# Patient Record
Sex: MALE | Race: BLACK OR AFRICAN AMERICAN | NOT HISPANIC OR LATINO | Employment: UNEMPLOYED | URBAN - METROPOLITAN AREA
[De-identification: names, ages, dates, MRNs, and addresses within clinical notes are randomized per-mention and may not be internally consistent; named-entity substitution may affect disease eponyms.]

---

## 2017-10-05 ENCOUNTER — OFFICE VISIT (OUTPATIENT)
Dept: RETAIL CLINIC | Facility: CLINIC | Age: 52
End: 2017-10-05

## 2017-10-05 VITALS
DIASTOLIC BLOOD PRESSURE: 76 MMHG | SYSTOLIC BLOOD PRESSURE: 108 MMHG | RESPIRATION RATE: 22 BRPM | HEART RATE: 76 BPM | TEMPERATURE: 97.9 F | BODY MASS INDEX: 25.41 KG/M2 | OXYGEN SATURATION: 98 % | HEIGHT: 72 IN | WEIGHT: 187.6 LBS

## 2017-10-05 DIAGNOSIS — J06.9 UPPER RESPIRATORY TRACT INFECTION, UNSPECIFIED TYPE: Primary | ICD-10-CM

## 2017-10-05 DIAGNOSIS — J40 BRONCHITIS: ICD-10-CM

## 2017-10-05 PROCEDURE — 99212 OFFICE O/P EST SF 10 MIN: CPT | Performed by: NURSE PRACTITIONER

## 2017-10-05 RX ORDER — ALBUTEROL SULFATE 90 UG/1
2 AEROSOL, METERED RESPIRATORY (INHALATION) EVERY 4 HOURS PRN
Qty: 6.7 G | Refills: 0 | Status: SHIPPED | OUTPATIENT
Start: 2017-10-05 | End: 2017-12-15

## 2017-10-05 RX ORDER — GUAIFENESIN 600 MG/1
1200 TABLET, EXTENDED RELEASE ORAL 2 TIMES DAILY
Qty: 40 TABLET | Refills: 0 | Status: SHIPPED | OUTPATIENT
Start: 2017-10-05 | End: 2017-10-15

## 2017-10-05 RX ORDER — DEXTROMETHORPHAN HYDROBROMIDE AND PROMETHAZINE HYDROCHLORIDE 15; 6.25 MG/5ML; MG/5ML
2.5 SYRUP ORAL 4 TIMES DAILY PRN
Qty: 118 ML | Refills: 0 | Status: SHIPPED | OUTPATIENT
Start: 2017-10-05 | End: 2018-01-22

## 2017-10-05 RX ORDER — BENZONATATE 200 MG/1
200 CAPSULE ORAL 3 TIMES DAILY PRN
Qty: 9 CAPSULE | Refills: 0 | Status: SHIPPED | OUTPATIENT
Start: 2017-10-05 | End: 2017-10-08

## 2017-10-05 RX ORDER — AZITHROMYCIN 250 MG/1
TABLET, FILM COATED ORAL
Qty: 6 TABLET | Refills: 0 | Status: SHIPPED | OUTPATIENT
Start: 2017-10-05 | End: 2018-01-22

## 2017-10-05 NOTE — PROGRESS NOTES
Subjective   Eduin Gaxiola is a 52 y.o. male, states he missed work yesterday and today due to not feeling well.  He has nasal congestion, fever (max 102.4), and has been coughing so hard it causes nausea/vomiting.    CHIEF COMPLAINT  URI      URI    This is a new problem. The current episode started yesterday. The problem has been gradually worsening. The maximum temperature recorded prior to his arrival was 102 - 102.9 F. The fever has been present for less than 1 day. Associated symptoms include congestion, coughing, diarrhea, ear pain, headaches, nausea, a plugged ear sensation, rhinorrhea, sinus pain and vomiting. Pertinent negatives include no abdominal pain, chest pain, dysuria, neck pain, rash, sneezing, sore throat, swollen glands or wheezing. He has tried acetaminophen for the symptoms. The treatment provided mild relief.       The following portions of the patient's history were reviewed and updated as appropriate: allergies, current mediations, past family history, past medical history, past social history, past surgical history, and problem list.    Review of Systems   Constitutional: Positive for appetite change, chills, fatigue and fever.   HENT: Positive for congestion, ear pain, rhinorrhea, sinus pain and sinus pressure. Negative for sneezing, sore throat, tinnitus, trouble swallowing and voice change.    Eyes: Negative for pain and itching.   Respiratory: Positive for cough. Negative for shortness of breath and wheezing.    Cardiovascular: Negative for chest pain.   Gastrointestinal: Positive for diarrhea, nausea and vomiting. Negative for abdominal pain.   Genitourinary: Negative for dysuria.   Musculoskeletal: Negative for neck pain.   Skin: Negative for rash.   Allergic/Immunologic: Negative for environmental allergies.   Neurological: Positive for dizziness and headaches.         Objective   Allergies   Allergen Reactions   • Penicillins Anaphylaxis         /76  Pulse 76  Temp 97.9 °F  "(36.6 °C)  Resp 22  Ht 72\" (182.9 cm)  Wt 187 lb 9.6 oz (85.1 kg)  SpO2 98%  BMI 25.44 kg/m2    Physical Exam   Constitutional: He is oriented to person, place, and time. He appears well-developed and well-nourished. He does not have a sickly appearance.   HENT:   Head: Normocephalic.   Right Ear: Tympanic membrane and ear canal normal.   Left Ear: Ear canal normal. A middle ear effusion is present.   Nose: Right sinus exhibits maxillary sinus tenderness. Right sinus exhibits no frontal sinus tenderness. Left sinus exhibits maxillary sinus tenderness. Left sinus exhibits no frontal sinus tenderness.   Mouth/Throat: Uvula is midline and mucous membranes are normal. Posterior oropharyngeal erythema present. No oropharyngeal exudate. No tonsillar exudate.   Eyes: Lids are normal. Pupils are equal, round, and reactive to light. Right conjunctiva is not injected. Left conjunctiva is not injected.   Cardiovascular: Normal rate and regular rhythm.    Pulmonary/Chest: Effort normal. He has wheezes.   Abdominal: Soft. Bowel sounds are normal. He exhibits no distension. There is no tenderness.   Lymphadenopathy:     He has no cervical adenopathy.   Neurological: He is alert and oriented to person, place, and time.   Skin: Skin is warm and dry.       Assessment/Plan   There are no diagnoses linked to this encounter.         An After Visit Summary was printed, reviewed, and given to the patient. Understanding verbalized and agrees with treatment plan.  If no improvement or becomes worse, follow up with primary or go to [facility]/ER.        October 5, 2017  11:18 AM  "

## 2017-10-05 NOTE — PATIENT INSTRUCTIONS
"Upper Respiratory Infection, Adult  Most upper respiratory infections (URIs) are caused by a virus. A URI affects the nose, throat, and upper air passages. The most common type of URI is often called \"the common cold.\"  HOME CARE   · Take medicines only as told by your doctor.  · Gargle warm saltwater or take cough drops to comfort your throat as told by your doctor.  · Use a warm mist humidifier or inhale steam from a shower to increase air moisture. This may make it easier to breathe.  · Drink enough fluid to keep your pee (urine) clear or pale yellow.  · Eat soups and other clear broths.  · Have a healthy diet.  · Rest as needed.  · Go back to work when your fever is gone or your doctor says it is okay.  ¨ You may need to stay home longer to avoid giving your URI to others.  ¨ You can also wear a face mask and wash your hands often to prevent spread of the virus.  · Use your inhaler more if you have asthma.  · Do not use any tobacco products, including cigarettes, chewing tobacco, or electronic cigarettes. If you need help quitting, ask your doctor.  GET HELP IF:  · You are getting worse, not better.  · Your symptoms are not helped by medicine.  · You have chills.  · You are getting more short of breath.  · You have brown or red mucus.  · You have yellow or brown discharge from your nose.  · You have pain in your face, especially when you bend forward.  · You have a fever.  · You have puffy (swollen) neck glands.  · You have pain while swallowing.  · You have white areas in the back of your throat.  GET HELP RIGHT AWAY IF:   · You have very bad or constant:    Headache.    Ear pain.    Pain in your forehead, behind your eyes, and over your cheekbones (sinus pain).    Chest pain.  · You have long-lasting (chronic) lung disease and any of the following:    Wheezing.    Long-lasting cough.    Coughing up blood.    A change in your usual mucus.  · You have a stiff neck.  · You have changes in your:    Vision.   "  Hearing.    Thinking.    Mood.  MAKE SURE YOU:   · Understand these instructions.  · Will watch your condition.  · Will get help right away if you are not doing well or get worse.     This information is not intended to replace advice given to you by your health care provider. Make sure you discuss any questions you have with your health care provider.     Document Released: 06/05/2009 Document Revised: 05/03/2016 Document Reviewed: 03/25/2015  [employer] Interactive Patient Education ©2017 [employer]

## 2017-12-15 ENCOUNTER — HOSPITAL ENCOUNTER (EMERGENCY)
Facility: HOSPITAL | Age: 52
Discharge: HOME | End: 2017-12-15
Attending: EMERGENCY MEDICINE | Admitting: EMERGENCY MEDICINE

## 2017-12-15 ENCOUNTER — APPOINTMENT (OUTPATIENT)
Dept: CT IMAGING | Facility: HOSPITAL | Age: 52
End: 2017-12-15

## 2017-12-15 VITALS
RESPIRATION RATE: 16 BRPM | BODY MASS INDEX: 23.86 KG/M2 | HEIGHT: 73 IN | DIASTOLIC BLOOD PRESSURE: 86 MMHG | TEMPERATURE: 97.7 F | HEART RATE: 66 BPM | OXYGEN SATURATION: 100 % | SYSTOLIC BLOOD PRESSURE: 119 MMHG | WEIGHT: 180 LBS

## 2017-12-15 DIAGNOSIS — J40 BRONCHITIS: ICD-10-CM

## 2017-12-15 DIAGNOSIS — S30.0XXA LUMBAR CONTUSION, INITIAL ENCOUNTER: ICD-10-CM

## 2017-12-15 DIAGNOSIS — S29.8XXA BLUNT CHEST TRAUMA, INITIAL ENCOUNTER: Primary | ICD-10-CM

## 2017-12-15 LAB
ALBUMIN SERPL-MCNC: 4.4 G/DL (ref 3.2–4.8)
ALBUMIN/GLOB SERPL: 1.8 G/DL (ref 1.5–2.5)
ALP SERPL-CCNC: 60 U/L (ref 25–100)
ALT SERPL W P-5'-P-CCNC: 27 U/L (ref 7–40)
ANION GAP SERPL CALCULATED.3IONS-SCNC: 9 MMOL/L (ref 3–11)
AST SERPL-CCNC: 20 U/L (ref 0–33)
BASOPHILS # BLD AUTO: 0.05 10*3/MM3 (ref 0–0.2)
BASOPHILS NFR BLD AUTO: 0.4 % (ref 0–1)
BILIRUB SERPL-MCNC: 0.5 MG/DL (ref 0.3–1.2)
BUN BLD-MCNC: 9 MG/DL (ref 9–23)
BUN/CREAT SERPL: 11.3 (ref 7–25)
CALCIUM SPEC-SCNC: 9.5 MG/DL (ref 8.7–10.4)
CHLORIDE SERPL-SCNC: 105 MMOL/L (ref 99–109)
CO2 SERPL-SCNC: 27 MMOL/L (ref 20–31)
CREAT BLD-MCNC: 0.8 MG/DL (ref 0.6–1.3)
DEPRECATED RDW RBC AUTO: 47.9 FL (ref 37–54)
EOSINOPHIL # BLD AUTO: 0.11 10*3/MM3 (ref 0–0.3)
EOSINOPHIL NFR BLD AUTO: 0.9 % (ref 0–3)
ERYTHROCYTE [DISTWIDTH] IN BLOOD BY AUTOMATED COUNT: 14 % (ref 11.3–14.5)
GFR SERPL CREATININE-BSD FRML MDRD: 124 ML/MIN/1.73
GLOBULIN UR ELPH-MCNC: 2.5 GM/DL
GLUCOSE BLD-MCNC: 94 MG/DL (ref 70–100)
HCT VFR BLD AUTO: 43.7 % (ref 38.9–50.9)
HGB BLD-MCNC: 14.6 G/DL (ref 13.1–17.5)
IMM GRANULOCYTES # BLD: 0.02 10*3/MM3 (ref 0–0.03)
IMM GRANULOCYTES NFR BLD: 0.2 % (ref 0–0.6)
LYMPHOCYTES # BLD AUTO: 2.49 10*3/MM3 (ref 0.6–4.8)
LYMPHOCYTES NFR BLD AUTO: 20.4 % (ref 24–44)
MCH RBC QN AUTO: 31.3 PG (ref 27–31)
MCHC RBC AUTO-ENTMCNC: 33.4 G/DL (ref 32–36)
MCV RBC AUTO: 93.6 FL (ref 80–99)
MONOCYTES # BLD AUTO: 0.87 10*3/MM3 (ref 0–1)
MONOCYTES NFR BLD AUTO: 7.1 % (ref 0–12)
NEUTROPHILS # BLD AUTO: 8.66 10*3/MM3 (ref 1.5–8.3)
NEUTROPHILS NFR BLD AUTO: 71 % (ref 41–71)
PLATELET # BLD AUTO: 260 10*3/MM3 (ref 150–450)
PMV BLD AUTO: 10.4 FL (ref 6–12)
POTASSIUM BLD-SCNC: 3.9 MMOL/L (ref 3.5–5.5)
PROT SERPL-MCNC: 6.9 G/DL (ref 5.7–8.2)
RBC # BLD AUTO: 4.67 10*6/MM3 (ref 4.2–5.76)
SODIUM BLD-SCNC: 141 MMOL/L (ref 132–146)
WBC NRBC COR # BLD: 12.2 10*3/MM3 (ref 3.5–10.8)

## 2017-12-15 PROCEDURE — 71250 CT THORAX DX C-: CPT

## 2017-12-15 PROCEDURE — 80053 COMPREHEN METABOLIC PANEL: CPT | Performed by: EMERGENCY MEDICINE

## 2017-12-15 PROCEDURE — 72131 CT LUMBAR SPINE W/O DYE: CPT

## 2017-12-15 PROCEDURE — 99284 EMERGENCY DEPT VISIT MOD MDM: CPT

## 2017-12-15 PROCEDURE — 85025 COMPLETE CBC W/AUTO DIFF WBC: CPT | Performed by: EMERGENCY MEDICINE

## 2017-12-15 PROCEDURE — 93005 ELECTROCARDIOGRAM TRACING: CPT

## 2017-12-15 RX ORDER — HYDROCODONE BITARTRATE AND ACETAMINOPHEN 5; 325 MG/1; MG/1
1 TABLET ORAL EVERY 6 HOURS PRN
Qty: 12 TABLET | Refills: 0 | Status: SHIPPED | OUTPATIENT
Start: 2017-12-15 | End: 2018-01-22

## 2017-12-15 RX ORDER — HYDROCODONE BITARTRATE AND ACETAMINOPHEN 5; 325 MG/1; MG/1
1 TABLET ORAL ONCE
Status: COMPLETED | OUTPATIENT
Start: 2017-12-15 | End: 2017-12-15

## 2017-12-15 RX ORDER — ALBUTEROL SULFATE 90 UG/1
2 AEROSOL, METERED RESPIRATORY (INHALATION) EVERY 4 HOURS PRN
Qty: 6.7 G | Refills: 0 | Status: SHIPPED | OUTPATIENT
Start: 2017-12-15 | End: 2018-02-12

## 2017-12-15 RX ADMIN — HYDROCODONE BITARTRATE AND ACETAMINOPHEN 1 TABLET: 5; 325 TABLET ORAL at 11:47

## 2017-12-15 NOTE — ED PROVIDER NOTES
Subjective   Patient is a 52 y.o. male presenting with chest pain.   History provided by:  Patient   used: No    Chest Pain   Pain location:  Substernal area  Pain quality: sharp and stabbing    Pain radiates to:  Does not radiate  Pain severity:  Severe  Onset quality:  Sudden  Timing:  Intermittent  Progression:  Waxing and waning  Chronicity:  New  Context comment:  Pt was using a log splitter at job.  Log kicked out  hit him in the chest. Pt fell back and hurt backat same time.   Relieved by:  Rest  Worsened by:  Movement, coughing and deep breathing  Ineffective treatments:  None tried  Associated symptoms: no abdominal pain, no back pain, no diaphoresis, no fatigue, no headache, no heartburn, no nausea, no orthopnea, no palpitations, no shortness of breath, no vomiting and no weakness    Risk factors: male sex    Risk factors: no coronary artery disease, no diabetes mellitus, no Mckinley-Danlos syndrome, no hypertension, no immobilization, no prior DVT/PE and no surgery        Review of Systems   Constitutional: Negative for diaphoresis and fatigue.   Respiratory: Negative for chest tightness, shortness of breath and wheezing.    Cardiovascular: Positive for chest pain. Negative for palpitations and orthopnea.   Gastrointestinal: Negative for abdominal pain, constipation, heartburn, nausea and vomiting.   Genitourinary: Negative for dysuria, frequency, hematuria and urgency.   Musculoskeletal: Negative for back pain and neck pain.   Skin: Negative for pallor and rash.   Neurological: Negative for weakness and headaches.   Psychiatric/Behavioral: Negative.    All other systems reviewed and are negative.      History reviewed. No pertinent past medical history.    Allergies   Allergen Reactions   • Penicillins Anaphylaxis       Past Surgical History:   Procedure Laterality Date   • ARM REPLANTATION Right    • HERNIA REPAIR         Family History   Problem Relation Age of Onset   • Heart disease  Mother    • Cancer Father        Social History     Social History   • Marital status:      Spouse name: N/A   • Number of children: N/A   • Years of education: N/A     Social History Main Topics   • Smoking status: Current Every Day Smoker     Packs/day: 0.50     Years: 40.00     Types: Cigarettes   • Smokeless tobacco: Never Used   • Alcohol use No   • Drug use: No   • Sexual activity: Defer     Other Topics Concern   • None     Social History Narrative   • None           Objective   Physical Exam   Constitutional: He is oriented to person, place, and time. He appears well-developed and well-nourished.   HENT:   Head: Normocephalic and atraumatic.   Right Ear: External ear normal.   Left Ear: External ear normal.   Nose: Nose normal.   Mouth/Throat: Oropharynx is clear and moist.   Eyes: Conjunctivae and EOM are normal. Pupils are equal, round, and reactive to light. No scleral icterus.   Neck: Normal range of motion. No thyromegaly present.   Cardiovascular: Normal rate, regular rhythm and normal heart sounds.    Pulmonary/Chest: Effort normal and breath sounds normal. No respiratory distress. He has no wheezes. He has no rales. He exhibits tenderness ( tender over the sternum.  There is no ecchymosis abrasions or crepitus.   ribs are also diffusely tender on either side of the sternum.  No flail segment).   Abdominal: Soft. Bowel sounds are normal. He exhibits no distension. There is no tenderness.   Musculoskeletal: Normal range of motion.   Lymphadenopathy:     He has no cervical adenopathy.   Neurological: He is alert and oriented to person, place, and time. He has normal reflexes. He displays normal reflexes. No cranial nerve deficit. Coordination normal.   Skin: Skin is warm and dry.   Psychiatric: He has a normal mood and affect. His behavior is normal. Judgment and thought content normal.   Nursing note and vitals reviewed.      Procedures         ED Course  ED Course                  MDM  Number  of Diagnoses or Management Options  new and requires workup  new and requires workup     Amount and/or Complexity of Data Reviewed  Clinical lab tests: reviewed and ordered  Tests in the radiology section of CPT®: reviewed and ordered  Discuss the patient with other providers: yes    Patient Progress  Patient progress: stable      Final diagnoses:   Blunt chest trauma, initial encounter   Lumbar contusion, initial encounter            RAVEN Anne  12/18/17 0737

## 2017-12-17 ENCOUNTER — APPOINTMENT (OUTPATIENT)
Dept: CT IMAGING | Facility: HOSPITAL | Age: 52
End: 2017-12-17

## 2017-12-17 ENCOUNTER — HOSPITAL ENCOUNTER (EMERGENCY)
Facility: HOSPITAL | Age: 52
Discharge: HOME | End: 2017-12-17
Attending: EMERGENCY MEDICINE | Admitting: EMERGENCY MEDICINE

## 2017-12-17 VITALS
HEIGHT: 73 IN | OXYGEN SATURATION: 98 % | WEIGHT: 180 LBS | HEART RATE: 85 BPM | DIASTOLIC BLOOD PRESSURE: 72 MMHG | RESPIRATION RATE: 20 BRPM | TEMPERATURE: 100.2 F | BODY MASS INDEX: 23.86 KG/M2 | SYSTOLIC BLOOD PRESSURE: 105 MMHG

## 2017-12-17 DIAGNOSIS — A08.4 VIRAL GASTROENTERITIS: ICD-10-CM

## 2017-12-17 DIAGNOSIS — S29.8XXA BLUNT TRAUMA TO CHEST, INITIAL ENCOUNTER: Primary | ICD-10-CM

## 2017-12-17 LAB
ALBUMIN SERPL-MCNC: 4.7 G/DL (ref 3.2–4.8)
ALBUMIN/GLOB SERPL: 1.6 G/DL (ref 1.5–2.5)
ALP SERPL-CCNC: 64 U/L (ref 25–100)
ALT SERPL W P-5'-P-CCNC: 28 U/L (ref 7–40)
ANION GAP SERPL CALCULATED.3IONS-SCNC: 7 MMOL/L (ref 3–11)
AST SERPL-CCNC: 25 U/L (ref 0–33)
BACTERIA UR QL AUTO: ABNORMAL /HPF
BASOPHILS # BLD AUTO: 0.01 10*3/MM3 (ref 0–0.2)
BASOPHILS NFR BLD AUTO: 0.1 % (ref 0–1)
BILIRUB SERPL-MCNC: 1 MG/DL (ref 0.3–1.2)
BILIRUB UR QL STRIP: NEGATIVE
BUN BLD-MCNC: 12 MG/DL (ref 9–23)
BUN/CREAT SERPL: 12 (ref 7–25)
CALCIUM SPEC-SCNC: 9.4 MG/DL (ref 8.7–10.4)
CHLORIDE SERPL-SCNC: 103 MMOL/L (ref 99–109)
CLARITY UR: ABNORMAL
CO2 SERPL-SCNC: 28 MMOL/L (ref 20–31)
COLOR UR: YELLOW
CREAT BLD-MCNC: 1 MG/DL (ref 0.6–1.3)
D-LACTATE SERPL-SCNC: 0.7 MMOL/L (ref 0.5–2)
DEPRECATED RDW RBC AUTO: 47.1 FL (ref 37–54)
EOSINOPHIL # BLD AUTO: 0.06 10*3/MM3 (ref 0–0.3)
EOSINOPHIL NFR BLD AUTO: 0.6 % (ref 0–3)
ERYTHROCYTE [DISTWIDTH] IN BLOOD BY AUTOMATED COUNT: 13.8 % (ref 11.3–14.5)
GFR SERPL CREATININE-BSD FRML MDRD: 96 ML/MIN/1.73
GLOBULIN UR ELPH-MCNC: 2.9 GM/DL
GLUCOSE BLD-MCNC: 97 MG/DL (ref 70–100)
GLUCOSE UR STRIP-MCNC: NEGATIVE MG/DL
HCT VFR BLD AUTO: 46.4 % (ref 38.9–50.9)
HGB BLD-MCNC: 15.9 G/DL (ref 13.1–17.5)
HGB UR QL STRIP.AUTO: NEGATIVE
HYALINE CASTS UR QL AUTO: ABNORMAL /LPF
IMM GRANULOCYTES # BLD: 0.03 10*3/MM3 (ref 0–0.03)
IMM GRANULOCYTES NFR BLD: 0.3 % (ref 0–0.6)
KETONES UR QL STRIP: NEGATIVE
LEUKOCYTE ESTERASE UR QL STRIP.AUTO: NEGATIVE
LIPASE SERPL-CCNC: 32 U/L (ref 6–51)
LYMPHOCYTES # BLD AUTO: 1.34 10*3/MM3 (ref 0.6–4.8)
LYMPHOCYTES NFR BLD AUTO: 13.3 % (ref 24–44)
MCH RBC QN AUTO: 31.8 PG (ref 27–31)
MCHC RBC AUTO-ENTMCNC: 34.3 G/DL (ref 32–36)
MCV RBC AUTO: 92.8 FL (ref 80–99)
MONOCYTES # BLD AUTO: 0.49 10*3/MM3 (ref 0–1)
MONOCYTES NFR BLD AUTO: 4.9 % (ref 0–12)
NEUTROPHILS # BLD AUTO: 8.15 10*3/MM3 (ref 1.5–8.3)
NEUTROPHILS NFR BLD AUTO: 80.8 % (ref 41–71)
NITRITE UR QL STRIP: NEGATIVE
PH UR STRIP.AUTO: 8 [PH] (ref 5–8)
PLATELET # BLD AUTO: 253 10*3/MM3 (ref 150–450)
PMV BLD AUTO: 10.6 FL (ref 6–12)
POTASSIUM BLD-SCNC: 3.7 MMOL/L (ref 3.5–5.5)
PROT SERPL-MCNC: 7.6 G/DL (ref 5.7–8.2)
PROT UR QL STRIP: NEGATIVE
RBC # BLD AUTO: 5 10*6/MM3 (ref 4.2–5.76)
RBC # UR: ABNORMAL /HPF
REF LAB TEST METHOD: ABNORMAL
SODIUM BLD-SCNC: 138 MMOL/L (ref 132–146)
SP GR UR STRIP: 1.02 (ref 1–1.03)
SQUAMOUS #/AREA URNS HPF: ABNORMAL /HPF
TROPONIN I SERPL-MCNC: 0 NG/ML (ref 0–0.07)
TROPONIN I SERPL-MCNC: 0 NG/ML (ref 0–0.07)
UROBILINOGEN UR QL STRIP: ABNORMAL
WBC NRBC COR # BLD: 10.08 10*3/MM3 (ref 3.5–10.8)
WBC UR QL AUTO: ABNORMAL /HPF

## 2017-12-17 PROCEDURE — 96375 TX/PRO/DX INJ NEW DRUG ADDON: CPT

## 2017-12-17 PROCEDURE — 81001 URINALYSIS AUTO W/SCOPE: CPT | Performed by: EMERGENCY MEDICINE

## 2017-12-17 PROCEDURE — 96374 THER/PROPH/DIAG INJ IV PUSH: CPT

## 2017-12-17 PROCEDURE — 84484 ASSAY OF TROPONIN QUANT: CPT

## 2017-12-17 PROCEDURE — 25010000002 FENTANYL CITRATE (PF) 100 MCG/2ML SOLUTION: Performed by: EMERGENCY MEDICINE

## 2017-12-17 PROCEDURE — 99284 EMERGENCY DEPT VISIT MOD MDM: CPT

## 2017-12-17 PROCEDURE — 25010000002 ONDANSETRON PER 1 MG: Performed by: EMERGENCY MEDICINE

## 2017-12-17 PROCEDURE — 80053 COMPREHEN METABOLIC PANEL: CPT | Performed by: EMERGENCY MEDICINE

## 2017-12-17 PROCEDURE — 93005 ELECTROCARDIOGRAM TRACING: CPT

## 2017-12-17 PROCEDURE — 83690 ASSAY OF LIPASE: CPT | Performed by: EMERGENCY MEDICINE

## 2017-12-17 PROCEDURE — 83605 ASSAY OF LACTIC ACID: CPT | Performed by: NURSE PRACTITIONER

## 2017-12-17 PROCEDURE — 93005 ELECTROCARDIOGRAM TRACING: CPT | Performed by: NURSE PRACTITIONER

## 2017-12-17 PROCEDURE — 96376 TX/PRO/DX INJ SAME DRUG ADON: CPT

## 2017-12-17 PROCEDURE — 71275 CT ANGIOGRAPHY CHEST: CPT

## 2017-12-17 PROCEDURE — 96361 HYDRATE IV INFUSION ADD-ON: CPT

## 2017-12-17 PROCEDURE — 0 IOPAMIDOL PER 1 ML: Performed by: EMERGENCY MEDICINE

## 2017-12-17 PROCEDURE — 93005 ELECTROCARDIOGRAM TRACING: CPT | Performed by: EMERGENCY MEDICINE

## 2017-12-17 PROCEDURE — 74177 CT ABD & PELVIS W/CONTRAST: CPT

## 2017-12-17 PROCEDURE — 85025 COMPLETE CBC W/AUTO DIFF WBC: CPT | Performed by: EMERGENCY MEDICINE

## 2017-12-17 RX ORDER — FENTANYL CITRATE 50 UG/ML
25 INJECTION, SOLUTION INTRAMUSCULAR; INTRAVENOUS ONCE
Status: COMPLETED | OUTPATIENT
Start: 2017-12-17 | End: 2017-12-17

## 2017-12-17 RX ORDER — ONDANSETRON 2 MG/ML
4 INJECTION INTRAMUSCULAR; INTRAVENOUS ONCE
Status: COMPLETED | OUTPATIENT
Start: 2017-12-17 | End: 2017-12-17

## 2017-12-17 RX ORDER — HYDROCODONE BITARTRATE AND ACETAMINOPHEN 5; 325 MG/1; MG/1
1 TABLET ORAL EVERY 6 HOURS PRN
Qty: 6 TABLET | Refills: 0 | Status: SHIPPED | OUTPATIENT
Start: 2017-12-17 | End: 2018-01-22

## 2017-12-17 RX ORDER — PROMETHAZINE HYDROCHLORIDE 25 MG/1
25 TABLET ORAL EVERY 6 HOURS PRN
Qty: 12 TABLET | Refills: 0 | Status: SHIPPED | OUTPATIENT
Start: 2017-12-17

## 2017-12-17 RX ORDER — SODIUM CHLORIDE 0.9 % (FLUSH) 0.9 %
10 SYRINGE (ML) INJECTION AS NEEDED
Status: DISCONTINUED | OUTPATIENT
Start: 2017-12-17 | End: 2017-12-18 | Stop reason: HOSPADM

## 2017-12-17 RX ADMIN — SODIUM CHLORIDE 1000 ML: 9 INJECTION, SOLUTION INTRAVENOUS at 19:37

## 2017-12-17 RX ADMIN — FENTANYL CITRATE 25 MCG: 50 INJECTION INTRAMUSCULAR; INTRAVENOUS at 19:38

## 2017-12-17 RX ADMIN — IOPAMIDOL 85 ML: 755 INJECTION, SOLUTION INTRAVENOUS at 20:08

## 2017-12-17 RX ADMIN — FENTANYL CITRATE 25 MCG: 50 INJECTION INTRAMUSCULAR; INTRAVENOUS at 22:19

## 2017-12-17 RX ADMIN — ONDANSETRON 4 MG: 2 INJECTION INTRAMUSCULAR; INTRAVENOUS at 19:37

## 2017-12-18 NOTE — ED PROVIDER NOTES
Subjective   HPI Comments: Eduin Gaxiola is a 52 y.o.male who presents to the ED with c/o chest pain. He states he was running a log splitter 3 days ago when a log suddenly shot off the machine hitting him in the chest. He reports he was experiencing increasing chest pain prompting him to come into the ED 2 days ago for evaluation. He was seen by RAVEN Spence, at the time c/o substerbnal chest tenderness but denied abdominal pain, vomiting, nausea, shortness of breath, or back pain. Today he c/o heavy, substernal chest pain radiating to his right-sided chest and back worsening with deep breathing. He states he developed cramping abdominal pain onset at 1730. He also c/o shortness of breath, subjective fevers and 2 vomiting episodes with greenish-yellow stomach contents. He reports he took his prescribed Lortab at 1730 with no relief. He reports a family history of heart issues, personal history of hypertension, but denies a personal history of heart issues. He reports he smokes cigarettes and drinks coffee.          Patient is a 52 y.o. male presenting with chest pain.   History provided by:  Patient  Chest Pain   Pain location:  Substernal area  Pain quality: pressure    Pain quality comment:  Heaviness  Pain radiates to:  Mid back  Pain severity:  Moderate  Onset quality:  Gradual  Duration:  3 days  Timing:  Constant  Progression:  Worsening  Chronicity:  New  Context: breathing (deep)    Relieved by:  Nothing  Worsened by:  Deep breathing  Ineffective treatments:  Rest (Lortab)  Associated symptoms: abdominal pain, back pain, fever, nausea, shortness of breath and vomiting    Risk factors: hypertension, male sex and smoking        Review of Systems   Constitutional: Positive for fever.   Respiratory: Positive for shortness of breath.    Cardiovascular: Positive for chest pain.   Gastrointestinal: Positive for abdominal pain, nausea and vomiting.   Musculoskeletal: Positive for back pain.   All other  systems reviewed and are negative.      History reviewed. No pertinent past medical history.    Allergies   Allergen Reactions   • Penicillins Anaphylaxis       Past Surgical History:   Procedure Laterality Date   • ARM REPLANTATION Right    • HERNIA REPAIR         Family History   Problem Relation Age of Onset   • Heart disease Mother    • Cancer Father        Social History     Social History   • Marital status:      Spouse name: N/A   • Number of children: N/A   • Years of education: N/A     Social History Main Topics   • Smoking status: Current Every Day Smoker     Packs/day: 0.50     Years: 40.00     Types: Cigarettes   • Smokeless tobacco: Never Used   • Alcohol use No   • Drug use: No   • Sexual activity: Defer     Other Topics Concern   • None     Social History Narrative   • None         Objective   Physical Exam   Constitutional: He is oriented to person, place, and time. He appears well-developed and well-nourished.   Pt is uncomfortable, reports pain with position changes.  Pt is holding chest with movement.    HENT:   Head: Normocephalic and atraumatic.   Right Ear: External ear normal.   Left Ear: External ear normal.   Mouth/Throat: Oropharynx is clear and moist. No oropharyngeal exudate.   Eyes: EOM are normal. Pupils are equal, round, and reactive to light.   Neck: Normal range of motion. No spinous process tenderness and no muscular tenderness present. Normal range of motion present.   Cardiovascular: Normal heart sounds.  Tachycardia present.    Pulmonary/Chest: Effort normal and breath sounds normal. No respiratory distress. He exhibits tenderness (substernal). He exhibits no crepitus, no deformity and no swelling.   Abdominal: Soft. Bowel sounds are normal. He exhibits no distension. There is tenderness (diffuse).   Musculoskeletal:        Cervical back: He exhibits normal range of motion, no tenderness and no bony tenderness.        Thoracic back: He exhibits normal range of motion, no  tenderness and no bony tenderness.        Lumbar back: He exhibits normal range of motion, no tenderness and no bony tenderness.   Neurological: He is alert and oriented to person, place, and time. No cranial nerve deficit.   Skin: Skin is warm and dry.   Psychiatric: He has a normal mood and affect. His behavior is normal.   Nursing note and vitals reviewed.      Procedures         ED Course  ED Course   Comment By Time   Pt is advised of the results at this time.  Pt will be dc home, Pt to f/u with PCP, [facility] outpt clinic. Pt to take meds as ordered. Pt agrees and verb understanding. Cynthia Chapin, APRN 12/17 2244         Recent Results (from the past 24 hour(s))   Comprehensive Metabolic Panel    Collection Time: 12/17/17  7:32 PM   Result Value Ref Range    Glucose 97 70 - 100 mg/dL    BUN 12 9 - 23 mg/dL    Creatinine 1.00 0.60 - 1.30 mg/dL    Sodium 138 132 - 146 mmol/L    Potassium 3.7 3.5 - 5.5 mmol/L    Chloride 103 99 - 109 mmol/L    CO2 28.0 20.0 - 31.0 mmol/L    Calcium 9.4 8.7 - 10.4 mg/dL    Total Protein 7.6 5.7 - 8.2 g/dL    Albumin 4.70 3.20 - 4.80 g/dL    ALT (SGPT) 28 7 - 40 U/L    AST (SGOT) 25 0 - 33 U/L    Alkaline Phosphatase 64 25 - 100 U/L    Total Bilirubin 1.0 0.3 - 1.2 mg/dL    eGFR  African Amer 96 >60 mL/min/1.73    Globulin 2.9 gm/dL    A/G Ratio 1.6 1.5 - 2.5 g/dL    BUN/Creatinine Ratio 12.0 7.0 - 25.0    Anion Gap 7.0 3.0 - 11.0 mmol/L   Lipase    Collection Time: 12/17/17  7:32 PM   Result Value Ref Range    Lipase 32 6 - 51 U/L   CBC Auto Differential    Collection Time: 12/17/17  7:32 PM   Result Value Ref Range    WBC 10.08 3.50 - 10.80 10*3/mm3    RBC 5.00 4.20 - 5.76 10*6/mm3    Hemoglobin 15.9 13.1 - 17.5 g/dL    Hematocrit 46.4 38.9 - 50.9 %    MCV 92.8 80.0 - 99.0 fL    MCH 31.8 (H) 27.0 - 31.0 pg    MCHC 34.3 32.0 - 36.0 g/dL    RDW 13.8 11.3 - 14.5 %    RDW-SD 47.1 37.0 - 54.0 fl    MPV 10.6 6.0 - 12.0 fL    Platelets 253 150 - 450 10*3/mm3    Neutrophil %  80.8 (H) 41.0 - 71.0 %    Lymphocyte % 13.3 (L) 24.0 - 44.0 %    Monocyte % 4.9 0.0 - 12.0 %    Eosinophil % 0.6 0.0 - 3.0 %    Basophil % 0.1 0.0 - 1.0 %    Immature Grans % 0.3 0.0 - 0.6 %    Neutrophils, Absolute 8.15 1.50 - 8.30 10*3/mm3    Lymphocytes, Absolute 1.34 0.60 - 4.80 10*3/mm3    Monocytes, Absolute 0.49 0.00 - 1.00 10*3/mm3    Eosinophils, Absolute 0.06 0.00 - 0.30 10*3/mm3    Basophils, Absolute 0.01 0.00 - 0.20 10*3/mm3    Immature Grans, Absolute 0.03 0.00 - 0.03 10*3/mm3   POC Troponin, Rapid    Collection Time: 12/17/17  7:34 PM   Result Value Ref Range    Troponin I 0.00 0.00 - 0.07 ng/mL   Urinalysis With / Culture If Indicated - Urine, Clean Catch    Collection Time: 12/17/17  7:50 PM   Result Value Ref Range    Color, UA Yellow Yellow, Straw    Appearance, UA Cloudy (A) Clear    pH, UA 8.0 5.0 - 8.0    Specific Gravity, UA 1.017 1.001 - 1.030    Glucose, UA Negative Negative    Ketones, UA Negative Negative    Bilirubin, UA Negative Negative    Blood, UA Negative Negative    Protein, UA Negative Negative    Leuk Esterase, UA Negative Negative    Nitrite, UA Negative Negative    Urobilinogen, UA 2.0 E.U./dL (A) 0.2 - 1.0 E.U./dL   Urinalysis, Microscopic Only - Urine, Clean Catch    Collection Time: 12/17/17  7:50 PM   Result Value Ref Range    RBC, UA None Seen None Seen, 0-2 /HPF    WBC, UA 0-2 (A) None Seen /HPF    Bacteria, UA None Seen None Seen, Trace /HPF    Squamous Epithelial Cells, UA None Seen None Seen, 0-2 /HPF    Hyaline Casts, UA None Seen 0 - 6 /LPF    Methodology Automated Microscopy    POC Troponin, Rapid    Collection Time: 12/17/17  9:38 PM   Result Value Ref Range    Troponin I 0.00 0.00 - 0.07 ng/mL   Lactic Acid, Plasma    Collection Time: 12/17/17 10:15 PM   Result Value Ref Range    Lactate 0.7 0.5 - 2.0 mmol/L     Note: In addition to lab results from this visit, the labs listed above may include labs taken at another facility or during a different encounter within  the last 24 hours. Please correlate lab times with ED admission and discharge times for further clarification of the services performed during this visit.    CT Abdomen Pelvis With Contrast   Final Result     Findings suspicious for enterocolitis likely of infectious etiology.     Recommend followup as clinically indicated.         Other nonemergent findings as described.          THIS DOCUMENT HAS BEEN ELECTRONICALLY SIGNED BY CRIS MENDOZA MD      CT Angiogram Chest With Contrast   Final Result     No evidence of acute pulmonary thromboembolism.        Unremarkable lungs and mediastinum without consolidation or effusion.          THIS DOCUMENT HAS BEEN ELECTRONICALLY SIGNED BY CRIS MENDOZA MD        Vitals:    12/17/17 2200 12/17/17 2230 12/17/17 2230 12/17/17 2300   BP: 104/75 105/71  105/72   BP Location:       Patient Position:       Pulse:       Resp:       Temp:   100.2 °F (37.9 °C)    TempSrc:   Oral    SpO2: 96% 95%  98%   Weight:       Height:         Medications   sodium chloride 0.9 % bolus 1,000 mL (0 mL Intravenous Stopped 12/17/17 2040)   ondansetron (ZOFRAN) injection 4 mg (4 mg Intravenous Given 12/17/17 1937)   fentaNYL citrate (PF) (SUBLIMAZE) injection 25 mcg (25 mcg Intravenous Given 12/17/17 1938)   iopamidol (ISOVUE-370) 76 % injection 100 mL (85 mL Intravenous Given 12/17/17 2008)   fentaNYL citrate (PF) (SUBLIMAZE) injection 25 mcg (25 mcg Intravenous Given 12/17/17 2219)     ECG/EMG Results (last 24 hours)     Procedure Component Value Units Date/Time    ECG 12 Lead [[id number]] Collected:  12/17/17 1846     Updated:  12/17/17 1847    ECG 12 Lead [[id number]] Collected:  12/17/17 2133     Updated:  12/17/17 2135                    MDM    Final diagnoses:   Blunt trauma to chest, initial encounter   Viral gastroenteritis       Documentation assistance provided by [employer] [employer].  Information recorded by the abeba was done at my direction and has been verified and validated by me.      Dalton Edge  12/17/17 1949       Cynthia Chapin, APRN  12/18/17 0448

## 2017-12-18 NOTE — DISCHARGE INSTRUCTIONS
Follow up with one of the [facility] physician groups below to setup primary care. If you have trouble following up, please call Mireya Rudolph, our transitional care nurse, at [phone].    (Dr. Levi, Dr. Petty, Dr. Matias, and Dr. Dsouza.)  [facility], Primary Care, [phone], [address], [place] [address]    [facility], Primary Care, [phone], [address], [address] [place], [address] [facility]) [facility] Medical [facility], Primary Care, [phone], [address], [address] [place], [address] [facility], Primary Care, [phone], [address], [address] [place], [address]     [place] [address] [facility], Primary Care, [phone], [address], [address] [place], [address]    [place] [address] [facility], Primary Care, [phone], [address]    [place], [address] [facility], Primary Care, [phone], [address], [address] [place], [address] [facility], Primary Care, [phone], [address], [address] [place], [address] [facility]) [facility], Primary Care, [phone], [address], [address] [place], [address] [facility], Primary Care, [phone], [address], [address] [place], [address] [facility], Primary Care, [phone], [address], [place], [address]     [facility], Primary Care, [phone], [address] [id number]

## 2017-12-23 ENCOUNTER — HOSPITAL ENCOUNTER (EMERGENCY)
Facility: HOSPITAL | Age: 52
Discharge: HOME | End: 2017-12-23
Attending: EMERGENCY MEDICINE | Admitting: EMERGENCY MEDICINE

## 2017-12-23 VITALS
TEMPERATURE: 97.6 F | WEIGHT: 185 LBS | SYSTOLIC BLOOD PRESSURE: 106 MMHG | RESPIRATION RATE: 18 BRPM | HEART RATE: 72 BPM | OXYGEN SATURATION: 96 % | HEIGHT: 73 IN | BODY MASS INDEX: 24.52 KG/M2 | DIASTOLIC BLOOD PRESSURE: 75 MMHG
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
## 2017-12-23 DIAGNOSIS — M54.41 ACUTE RIGHT-SIDED LOW BACK PAIN WITH RIGHT-SIDED SCIATICA: Primary | ICD-10-CM

## 2017-12-23 PROCEDURE — 99283 EMERGENCY DEPT VISIT LOW MDM: CPT

## 2017-12-23 PROCEDURE — 96372 THER/PROPH/DIAG INJ SC/IM: CPT

## 2017-12-23 PROCEDURE — 63710000001 DEXAMETHASONE PER 0.25 MG: Performed by: EMERGENCY MEDICINE

## 2017-12-23 PROCEDURE — 25010000002 KETOROLAC TROMETHAMINE PER 15 MG: Performed by: EMERGENCY MEDICINE

## 2017-12-23 RX ORDER — LIDOCAINE 50 MG/G
2 PATCH TOPICAL
Status: DISCONTINUED | OUTPATIENT
Start: 2017-12-23 | End: 2017-12-23 | Stop reason: HOSPADM

## 2017-12-23 RX ORDER — PREDNISONE 10 MG/1
TABLET ORAL
Qty: 35 TABLET | Refills: 0 | Status: SHIPPED | OUTPATIENT
Start: 2017-12-23 | End: 2018-05-29 | Stop reason: RX

## 2017-12-23 RX ORDER — GABAPENTIN 300 MG/1
300 CAPSULE ORAL 3 TIMES DAILY
Qty: 21 CAPSULE | Refills: 0 | Status: SHIPPED | OUTPATIENT
Start: 2017-12-23 | End: 2018-02-12 | Stop reason: SDUPTHER

## 2017-12-23 RX ORDER — GABAPENTIN 300 MG/1
300 CAPSULE ORAL ONCE
Status: COMPLETED | OUTPATIENT
Start: 2017-12-23 | End: 2017-12-23

## 2017-12-23 RX ORDER — KETOROLAC TROMETHAMINE 30 MG/ML
30 INJECTION, SOLUTION INTRAMUSCULAR; INTRAVENOUS ONCE
Status: COMPLETED | OUTPATIENT
Start: 2017-12-23 | End: 2017-12-23

## 2017-12-23 RX ORDER — MELOXICAM 15 MG/1
15 TABLET ORAL DAILY
Qty: 10 TABLET | Refills: 0 | Status: SHIPPED | OUTPATIENT
Start: 2017-12-23 | End: 2018-01-22

## 2017-12-23 RX ORDER — ACETAMINOPHEN AND CODEINE PHOSPHATE 300; 30 MG/1; MG/1
2 TABLET ORAL EVERY 6 HOURS PRN
Qty: 24 TABLET | Refills: 0 | Status: SHIPPED | OUTPATIENT
Start: 2017-12-23 | End: 2018-01-22

## 2017-12-23 RX ADMIN — GABAPENTIN 300 MG: 300 CAPSULE ORAL at 11:16

## 2017-12-23 RX ADMIN — LIDOCAINE 2 PATCH: 50 PATCH CUTANEOUS at 11:15

## 2017-12-23 RX ADMIN — KETOROLAC TROMETHAMINE 30 MG: 30 INJECTION, SOLUTION INTRAMUSCULAR at 11:11

## 2017-12-23 RX ADMIN — DEXAMETHASONE 10 MG: 4 TABLET ORAL at 11:16

## 2017-12-27 ENCOUNTER — HOSPITAL ENCOUNTER (EMERGENCY)
Facility: HOSPITAL | Age: 52
Discharge: HOME | End: 2017-12-27
Attending: EMERGENCY MEDICINE | Admitting: EMERGENCY MEDICINE

## 2017-12-27 ENCOUNTER — APPOINTMENT (OUTPATIENT)
Dept: MRI IMAGING | Facility: HOSPITAL | Age: 52
End: 2017-12-27

## 2017-12-27 VITALS
SYSTOLIC BLOOD PRESSURE: 131 MMHG | HEIGHT: 73 IN | RESPIRATION RATE: 20 BRPM | OXYGEN SATURATION: 100 % | WEIGHT: 185 LBS | DIASTOLIC BLOOD PRESSURE: 78 MMHG | BODY MASS INDEX: 24.52 KG/M2 | HEART RATE: 64 BPM | TEMPERATURE: 97.4 F

## 2017-12-27 DIAGNOSIS — N52.9 ERECTILE DYSFUNCTION, UNSPECIFIED ERECTILE DYSFUNCTION TYPE: ICD-10-CM

## 2017-12-27 DIAGNOSIS — M54.41 ACUTE RIGHT-SIDED LOW BACK PAIN WITH RIGHT-SIDED SCIATICA: Primary | ICD-10-CM

## 2017-12-27 PROCEDURE — 72158 MRI LUMBAR SPINE W/O & W/DYE: CPT

## 2017-12-27 PROCEDURE — 96372 THER/PROPH/DIAG INJ SC/IM: CPT

## 2017-12-27 PROCEDURE — 99284 EMERGENCY DEPT VISIT MOD MDM: CPT

## 2017-12-27 PROCEDURE — 0 GADOBENATE DIMEGLUMINE 529 MG/ML SOLUTION: Performed by: EMERGENCY MEDICINE

## 2017-12-27 PROCEDURE — 25010000002 HYDROMORPHONE PER 4 MG: Performed by: EMERGENCY MEDICINE

## 2017-12-27 PROCEDURE — A9577 INJ MULTIHANCE: HCPCS | Performed by: EMERGENCY MEDICINE

## 2017-12-27 RX ORDER — LIDOCAINE 50 MG/G
1 PATCH TOPICAL EVERY 24 HOURS
Qty: 30 PATCH | Refills: 0 | Status: SHIPPED | OUTPATIENT
Start: 2017-12-27 | End: 2018-01-22

## 2017-12-27 RX ORDER — HYDROCODONE BITARTRATE AND ACETAMINOPHEN 5; 325 MG/1; MG/1
1 TABLET ORAL EVERY 6 HOURS PRN
Qty: 20 TABLET | Refills: 0 | Status: SHIPPED | OUTPATIENT
Start: 2017-12-27 | End: 2018-01-22

## 2017-12-27 RX ORDER — HYDROMORPHONE HCL 110MG/55ML
2 PATIENT CONTROLLED ANALGESIA SYRINGE INTRAVENOUS ONCE
Status: COMPLETED | OUTPATIENT
Start: 2017-12-27 | End: 2017-12-27

## 2017-12-27 RX ADMIN — GADOBENATE DIMEGLUMINE 17 ML: 529 INJECTION, SOLUTION INTRAVENOUS at 15:10

## 2017-12-27 RX ADMIN — HYDROMORPHONE HYDROCHLORIDE 2 MG: 2 INJECTION INTRAMUSCULAR; INTRAVENOUS; SUBCUTANEOUS at 13:49

## 2018-01-04 ENCOUNTER — HOSPITAL ENCOUNTER (EMERGENCY)
Facility: HOSPITAL | Age: 53
Discharge: HOME | End: 2018-01-04
Attending: EMERGENCY MEDICINE | Admitting: EMERGENCY MEDICINE

## 2018-01-04 VITALS
SYSTOLIC BLOOD PRESSURE: 113 MMHG | BODY MASS INDEX: 21.87 KG/M2 | RESPIRATION RATE: 16 BRPM | HEART RATE: 84 BPM | HEIGHT: 73 IN | TEMPERATURE: 98.2 F | OXYGEN SATURATION: 98 % | WEIGHT: 165 LBS | DIASTOLIC BLOOD PRESSURE: 66 MMHG

## 2018-01-04 DIAGNOSIS — M54.41 RIGHT-SIDED LOW BACK PAIN WITH RIGHT-SIDED SCIATICA, UNSPECIFIED CHRONICITY: Primary | ICD-10-CM

## 2018-01-04 PROCEDURE — 99282 EMERGENCY DEPT VISIT SF MDM: CPT

## 2018-01-04 RX ORDER — NAPROXEN 500 MG/1
500 TABLET ORAL 2 TIMES DAILY WITH MEALS
Qty: 10 TABLET | Refills: 0 | Status: SHIPPED | OUTPATIENT
Start: 2018-01-04 | End: 2018-02-12

## 2018-01-04 NOTE — DISCHARGE INSTRUCTIONS
Return if loss of bowel or bladder control or inability to walk.  Please review the medications you are supposed to be taking according to prior physician recommendations. I have not changed your home medications during this visit. If your discharge instructions indicate that I have changed your home medications, this is not the case, and you should disregard. If you have any questions about the medication you should be taking at home, please call your physician.

## 2018-01-04 NOTE — ED PROVIDER NOTES
Subjective   HPI Comments: 52-year-old black male with history of lumbar pain and sciatica arrives here for medication refill.  Patient states that he is out of all of his medicines and would like refills on them he cannot get into Dr. Bellamy until Worker's Comp. has cleared him.  Patient states he continues to have pain in his lower back radiating down his leg.  He denies any weakness to his lower extremity is, loss of bowel or bladder control, fever, chills, new injury or other complaints..    Patient is a 52 y.o. male presenting with back pain.   History provided by:  Patient  Back Pain   Location:  Lumbar spine  Quality:  Aching  Radiates to:  R posterior upper leg  Pain severity:  Moderate  Pain is:  Worse during the day  Onset quality:  Gradual  Timing:  Constant  Progression:  Worsening  Chronicity:  Recurrent  Relieved by:  Nothing  Worsened by:  Movement and twisting  Ineffective treatments:  None tried  Associated symptoms: no bladder incontinence, no numbness, no paresthesias, no perianal numbness, no tingling and no weakness        Review of Systems   Genitourinary: Negative for bladder incontinence.   Musculoskeletal: Positive for back pain.   Neurological: Negative for tingling, weakness, numbness and paresthesias.   All other systems reviewed and are negative.      Past Medical History:   Diagnosis Date   • Sciatica        Allergies   Allergen Reactions   • Penicillins Anaphylaxis       Past Surgical History:   Procedure Laterality Date   • ARM REPLANTATION Right    • HERNIA REPAIR         Family History   Problem Relation Age of Onset   • Heart disease Mother    • Cancer Father        Social History     Social History   • Marital status:      Spouse name: N/A   • Number of children: N/A   • Years of education: N/A     Social History Main Topics   • Smoking status: Current Every Day Smoker     Packs/day: 0.50     Years: 40.00     Types: Cigarettes   • Smokeless tobacco: Never Used   • Alcohol  use No   • Drug use: No   • Sexual activity: Defer     Other Topics Concern   • None     Social History Narrative           Objective   Physical Exam   Constitutional: He appears well-developed and well-nourished.   HENT:   Head: Normocephalic and atraumatic.   Eyes: Conjunctivae are normal.   Pulmonary/Chest: Effort normal.   Genitourinary: Rectum normal.   Genitourinary Comments: Will sensation and perianal area.  Normal rectal tone.   Musculoskeletal:   Right slr. Negative cslr.     Neurological: He is alert. He has normal reflexes. He displays normal reflexes. He exhibits normal muscle tone.   Skin: Skin is warm and dry. No erythema.   Psychiatric: He has a normal mood and affect. His behavior is normal. Judgment and thought content normal.   Nursing note and vitals reviewed.      Procedures         ED Course  ED Course                  MDM    Final diagnoses:   Right-sided low back pain with right-sided sciatica, unspecified chronicity            RAVEN Mccall  01/04/18 0639

## 2018-01-22 ENCOUNTER — OFFICE VISIT (OUTPATIENT)
Dept: NEUROSURGERY | Facility: CLINIC | Age: 53
End: 2018-01-22

## 2018-01-22 VITALS
HEIGHT: 73 IN | WEIGHT: 181 LBS | BODY MASS INDEX: 23.99 KG/M2 | TEMPERATURE: 98.2 F | SYSTOLIC BLOOD PRESSURE: 100 MMHG | DIASTOLIC BLOOD PRESSURE: 60 MMHG

## 2018-01-22 DIAGNOSIS — M51.36 BULGING LUMBAR DISC: ICD-10-CM

## 2018-01-22 DIAGNOSIS — M51.36 DEGENERATIVE DISC DISEASE, LUMBAR: Primary | ICD-10-CM

## 2018-01-22 PROCEDURE — 99203 OFFICE O/P NEW LOW 30 MIN: CPT | Performed by: NEUROLOGICAL SURGERY

## 2018-01-22 RX ORDER — TRAMADOL HYDROCHLORIDE 50 MG/1
50 TABLET ORAL EVERY 4 HOURS PRN
Qty: 60 TABLET | Refills: 0 | Status: SHIPPED | OUTPATIENT
Start: 2018-01-22 | End: 2022-05-03 | Stop reason: DRUGHIGH

## 2018-01-22 RX ORDER — METHOCARBAMOL 750 MG/1
750 TABLET, FILM COATED ORAL
Qty: 30 TABLET | Refills: 0 | Status: SHIPPED | OUTPATIENT
Start: 2018-01-22 | End: 2018-02-12

## 2018-01-22 RX ORDER — NABUMETONE 750 MG/1
750 TABLET, FILM COATED ORAL 2 TIMES DAILY PRN
Qty: 60 TABLET | Refills: 0 | Status: SHIPPED | OUTPATIENT
Start: 2018-01-22 | End: 2018-02-12

## 2018-01-22 RX ORDER — TRAMADOL HYDROCHLORIDE 50 MG/1
TABLET ORAL
Refills: 0 | COMMUNITY
Start: 2017-12-19 | End: 2018-01-22 | Stop reason: SDUPTHER

## 2018-01-22 NOTE — PROGRESS NOTES
Eduin Gaxiola  1965  [id number]      Chief Complaint   Patient presents with   • Back Pain   • Leg Pain       HISTORY OF PRESENT ILLNESS:  This is a 52-year-old male who states he was in excellent health until he was involved in a work-related injury 12/14/2017 from which she has remained symptomatic.  He has made several visits to the emergency room which have been documented by his EMR.  He is having severe pain in his back which radiates into the right lower extremity.  Radiates from his hip posterior thigh into his foot.  He has no symptoms on his left whatsoever.  Lumbar MRI was performed is referred for neurosurgical consultation.     Past Medical History:   Diagnosis Date   • Sciatica        Past Surgical History:   Procedure Laterality Date   • ARM REPLANTATION Right    • HERNIA REPAIR         Family History   Problem Relation Age of Onset   • Heart disease Mother    • Cancer Father        Social History     Social History   • Marital status: Single     Spouse name: N/A   • Number of children: N/A   • Years of education: N/A     Occupational History   • Not on file.     Social History Main Topics   • Smoking status: Current Every Day Smoker     Packs/day: 0.50     Years: 40.00     Types: Cigarettes   • Smokeless tobacco: Never Used   • Alcohol use No   • Drug use: No   • Sexual activity: Defer     Other Topics Concern   • Not on file     Social History Narrative       Allergies   Allergen Reactions   • Penicillins Anaphylaxis         Current Outpatient Prescriptions:   •  acetaminophen-codeine (TYLENOL #3) 300-30 MG per tablet, Take 2 tablets by mouth Every 6 (Six) Hours As Needed for Moderate Pain ., Disp: 24 tablet, Rfl: 0  •  albuterol (PROVENTIL HFA;VENTOLIN HFA) 108 (90 Base) MCG/ACT inhaler, Inhale 2 puffs Every 4 (Four) Hours As Needed for Wheezing., Disp: 6.7 g, Rfl: 0  •  azithromycin (ZITHROMAX Z-ALYSSA) 250 MG tablet, Take 2 tablets the first day, then 1 tablet daily for 4 days., Disp: 6  tablet, Rfl: 0  •  gabapentin (NEURONTIN) 300 MG capsule, Take 1 capsule by mouth 3 (Three) Times a Day., Disp: 21 capsule, Rfl: 0  •  meloxicam (MOBIC) 15 MG tablet, Take 1 tablet by mouth Daily., Disp: 10 tablet, Rfl: 0  •  naproxen (NAPROSYN) 500 MG tablet, Take 1 tablet by mouth 2 (Two) Times a Day With Meals., Disp: 10 tablet, Rfl: 0  •  predniSONE (DELTASONE) 10 MG tablet, 40mg PO daily for 5 days, then 20mg PO daily for 5 days, then 10mg PO daily for 5 days, Disp: 35 tablet, Rfl: 0  •  promethazine (PHENERGAN) 25 MG tablet, Take 1 tablet by mouth Every 6 (Six) Hours As Needed for Nausea or Vomiting., Disp: 12 tablet, Rfl: 0  •  traMADol (ULTRAM) 50 MG tablet, TK 1 T PO Q 6 H X 7 DAYS PRF PAIN, Disp: , Rfl: 0    Review of Systems   Constitutional: Positive for appetite change and unexpected weight change. Negative for activity change, chills, diaphoresis, fatigue and fever.   HENT: Negative for congestion, dental problem, drooling, ear discharge, ear pain, facial swelling, hearing loss, mouth sores, nosebleeds, postnasal drip, rhinorrhea, sinus pressure, sneezing, sore throat, tinnitus, trouble swallowing and voice change.    Eyes: Negative for photophobia, pain, discharge, redness, itching and visual disturbance.   Respiratory: Negative for apnea, cough, choking, chest tightness, shortness of breath, wheezing and stridor.    Cardiovascular: Negative for chest pain, palpitations and leg swelling.   Gastrointestinal: Negative for abdominal distention, abdominal pain, anal bleeding, blood in stool, constipation, diarrhea, nausea, rectal pain and vomiting.   Endocrine: Negative for cold intolerance, heat intolerance, polydipsia, polyphagia and polyuria.   Genitourinary: Positive for flank pain. Negative for decreased urine volume, difficulty urinating, dysuria, enuresis, frequency, genital sores, hematuria and urgency.        Pelvic pain   Musculoskeletal: Positive for arthralgias, back pain and neck pain.  "Negative for gait problem, joint swelling, myalgias and neck stiffness.   Skin: Negative for color change, pallor, rash and wound.   Allergic/Immunologic: Negative for environmental allergies, food allergies and immunocompromised state.   Neurological: Negative for dizziness, tremors, seizures, syncope, facial asymmetry, speech difficulty, weakness, light-headedness, numbness and headaches.   Hematological: Negative for adenopathy. Does not bruise/bleed easily.   Psychiatric/Behavioral: Negative for agitation, behavioral problems, confusion, decreased concentration, dysphoric mood, hallucinations, self-injury, sleep disturbance and suicidal ideas. The patient is not nervous/anxious and is not hyperactive.    All other systems reviewed and are negative.      Vitals:    01/22/18 1243   BP: 100/60   BP Location: Right arm   Patient Position: Sitting   Temp: 98.2 °F (36.8 °C)   TempSrc: Temporal Artery    Weight: 82.1 kg (181 lb)   Height: 185.4 cm (72.99\")       Neurological Examination:    Mental status/speech: The patient is alert and oriented.  Speech is clear without aphysia or dysarthria.  No overt cognitive deficits.    Cranial nerve examination:    Olfaction: Smell is intact.  Vision: Vision is intact without visual field abnormalities.  Funduscopic examination is normal.  No pupillary irregularity.  Ocular motor examination: The extraocular muscles are intact.  There is no diplopia.  The pupil is round and reactive to both light and accommodation.  There is no nystagmus.  Facial movement/sensation: There is no facial weakness.  Sensation is intact in the first, second, and third divisions of the trigeminal nerve.  The corneal reflex is intact.  Auditory: Hearing is intact to finger rub bilaterally.  Cranial nerves IX, X, XI, XII: Phonation is normal.  No dysphagia.  Tongue is protruded in the midline without atrophy.  The gag reflex is intact.  Shoulder shrug is normal.    Musculoligamentous ligamentous " examination: He had decreased range of motion lumbar spine.  Straight leg raising on the right side produces severe back pain.  I am unable to document any weakness sensory loss or reflex asymmetry.  No Babinski Bhavin or clonus.    Medical Decision Making:     Diagnostic Data Set:  The lumbar MRI data set shows disc degeneration with protrusion L3-L4 and L5-S1.  At L3-L4 there is deviation of the intervertebral disc to the right.  The neuroforamen however is not significantly come breast.  At L5-S1 there is a rather significant disc protrusion to the left.  This is a side opposite of his pain therefore is clinical relevance is suspect      Assessment:  Disc protrusion L3-L4 (right); disc protrusion L5-S1 (left)           Recommendations:  The disc protrusions as evidenced by the MRI do not significantly crowd or compress the nerve root therefore I would expect for this to be able to resolve without surgical intervention.  I have sent him to physical therapy.  I've given him prescription of Relafen 750 mg twice a day, Robaxin 750 mg twice a day and tramadol 50 mg every 4 for pain management.  He will return to see me in 3 weeks.  At that time if he is not decidedly better I would recommend myelography, post-myelography CT scan and EMG/NCV.         I greatly appreciate the opportunity to see and evaluate this individual.  If you have questions or concerns regarding issues that I may have overlooked please call me at any time: [phone].  Julio Bellamy M.D.  [facility]  [address] [place] [address]    Scribed for Lucio Bellamy MD by Elvin Arnold CMA. 1/22/2018  12:50 PM     I have read and concur with the information provided by the scribe.  Lucio Bellamy MD

## 2018-01-22 NOTE — PROGRESS NOTES
Eduin Gaxiola  1965  [id number]      Chief Complaint   Patient presents with   • Back Pain   • Leg Pain       HISTORY OF PRESENT ILLNESS:  [ ]    Past Medical History:   Diagnosis Date   • Sciatica        Past Surgical History:   Procedure Laterality Date   • ARM REPLANTATION Right    • HERNIA REPAIR         Family History   Problem Relation Age of Onset   • Heart disease Mother    • Cancer Father        Social History     Social History   • Marital status: Single     Spouse name: N/A   • Number of children: N/A   • Years of education: N/A     Occupational History   • Not on file.     Social History Main Topics   • Smoking status: Current Every Day Smoker     Packs/day: 0.50     Years: 40.00     Types: Cigarettes   • Smokeless tobacco: Never Used   • Alcohol use No   • Drug use: No   • Sexual activity: Defer     Other Topics Concern   • Not on file     Social History Narrative       Allergies   Allergen Reactions   • Penicillins Anaphylaxis         Current Outpatient Prescriptions:   •  acetaminophen-codeine (TYLENOL #3) 300-30 MG per tablet, Take 2 tablets by mouth Every 6 (Six) Hours As Needed for Moderate Pain ., Disp: 24 tablet, Rfl: 0  •  albuterol (PROVENTIL HFA;VENTOLIN HFA) 108 (90 Base) MCG/ACT inhaler, Inhale 2 puffs Every 4 (Four) Hours As Needed for Wheezing., Disp: 6.7 g, Rfl: 0  •  azithromycin (ZITHROMAX Z-ALYSSA) 250 MG tablet, Take 2 tablets the first day, then 1 tablet daily for 4 days., Disp: 6 tablet, Rfl: 0  •  gabapentin (NEURONTIN) 300 MG capsule, Take 1 capsule by mouth 3 (Three) Times a Day., Disp: 21 capsule, Rfl: 0  •  meloxicam (MOBIC) 15 MG tablet, Take 1 tablet by mouth Daily., Disp: 10 tablet, Rfl: 0  •  naproxen (NAPROSYN) 500 MG tablet, Take 1 tablet by mouth 2 (Two) Times a Day With Meals., Disp: 10 tablet, Rfl: 0  •  predniSONE (DELTASONE) 10 MG tablet, 40mg PO daily for 5 days, then 20mg PO daily for 5 days, then 10mg PO daily for 5 days, Disp: 35 tablet, Rfl: 0  •   promethazine (PHENERGAN) 25 MG tablet, Take 1 tablet by mouth Every 6 (Six) Hours As Needed for Nausea or Vomiting., Disp: 12 tablet, Rfl: 0  •  traMADol (ULTRAM) 50 MG tablet, TK 1 T PO Q 6 H X 7 DAYS PRF PAIN, Disp: , Rfl: 0    Review of Systems   Constitutional: Positive for appetite change and unexpected weight change. Negative for activity change, chills, diaphoresis, fatigue and fever.   HENT: Negative for congestion, dental problem, drooling, ear discharge, ear pain, facial swelling, hearing loss, mouth sores, nosebleeds, postnasal drip, rhinorrhea, sinus pressure, sneezing, sore throat, tinnitus, trouble swallowing and voice change.    Eyes: Negative for photophobia, pain, discharge, redness, itching and visual disturbance.   Respiratory: Negative for apnea, cough, choking, chest tightness, shortness of breath, wheezing and stridor.    Cardiovascular: Negative for chest pain, palpitations and leg swelling.   Gastrointestinal: Negative for abdominal distention, abdominal pain, anal bleeding, blood in stool, constipation, diarrhea, nausea, rectal pain and vomiting.   Endocrine: Negative for cold intolerance, heat intolerance, polydipsia, polyphagia and polyuria.   Genitourinary: Positive for flank pain. Negative for decreased urine volume, difficulty urinating, dysuria, enuresis, frequency, genital sores, hematuria and urgency.   Musculoskeletal: Positive for arthralgias, back pain and neck pain. Negative for gait problem, joint swelling, myalgias and neck stiffness.   Skin: Negative for color change, pallor, rash and wound.   Allergic/Immunologic: Negative for environmental allergies, food allergies and immunocompromised state.   Neurological: Negative for dizziness, tremors, seizures, syncope, facial asymmetry, speech difficulty, weakness, light-headedness, numbness and headaches.   Hematological: Negative for adenopathy. Does not bruise/bleed easily.   Psychiatric/Behavioral: Negative for agitation,  "behavioral problems, confusion, decreased concentration, dysphoric mood, hallucinations, self-injury, sleep disturbance and suicidal ideas. The patient is not nervous/anxious and is not hyperactive.        Vitals:    01/22/18 1243   BP: 100/60   BP Location: Right arm   Patient Position: Sitting   Temp: 98.2 °F (36.8 °C)   TempSrc: Temporal Artery    Weight: 82.1 kg (181 lb)   Height: 185.4 cm (72.99\")       Neurological Examination:            Medical Decision Making:     Diagnostic Data Set:  [ ]      Assessment:  [ ]          Recommendations:  [ ]        I greatly appreciate the opportunity to see and evaluate this individual.  If you have questions or concerns regarding issues that I may have overlooked please call me at any time: [phone].  Julio Bellamy M.D.  [facility]  [address] [place] [address]    "

## 2018-01-31 ENCOUNTER — APPOINTMENT (OUTPATIENT)
Dept: PHYSICAL THERAPY | Facility: HOSPITAL | Age: 53
End: 2018-01-31
Attending: NEUROLOGICAL SURGERY

## 2018-02-02 ENCOUNTER — TELEPHONE (OUTPATIENT)
Dept: NEUROSURGERY | Facility: CLINIC | Age: 53
End: 2018-02-02

## 2018-02-02 NOTE — TELEPHONE ENCOUNTER
Left a VM for pt to return my call-     Rx at my desk till I hear from pt.      -this is a hand written RX so I have made a copy for medical records.

## 2018-02-02 NOTE — TELEPHONE ENCOUNTER
"Provider: Josesito       Surgery:  N/A   Surgery Date:    Last visit: 01/22/18    Next visit: 02/12/18        AMBER: 12/23/2017 Gabapentin 300MG 1965 21 7 Jax Vu [place] [id number] [place] 1  12/27/2017 Hydrocodone/Acetaminophen 325MG/5MG    1965 20 5 Donato Tsang [place] [id number] [place] 20 1    01/22/2018 Tramadol Hcl 50MG 1965 60 10 Lucio Bellamy [place] [id number] [place] 30 3      Reason for call: pt called in extremely aggravated stated \"I'm tired of this pain and the dietz pills I got ain't cutting it.\"      Pt has been taking Tramadol 50mg 2 po Q3h - written for 1 po Q4H.  He takes Robaxin and Relafen as well.   States ice nor heat will help him.      - requesting something stronger.     -pt was to go to PT and f/u on the 02/12/18, he has his first PT visit on today @245pm.    -I expressed to pt that we do not typically Rx anything stronger.       "

## 2018-02-02 NOTE — TELEPHONE ENCOUNTER
I tried calling Mr. Gaxiola and got the [employer] answering machine.  He should probably be in physical therapy at this time but if you can reach him him know that we can give him more hydrocodone for his pain.  It can only be for 7 days at a time.  He can either combine pick it up today or Monday or we can mail.  WhichEver is best for him.

## 2018-02-12 ENCOUNTER — OFFICE VISIT (OUTPATIENT)
Dept: NEUROSURGERY | Facility: CLINIC | Age: 53
End: 2018-02-12

## 2018-02-12 VITALS
BODY MASS INDEX: 24.78 KG/M2 | SYSTOLIC BLOOD PRESSURE: 128 MMHG | DIASTOLIC BLOOD PRESSURE: 82 MMHG | HEIGHT: 73 IN | WEIGHT: 187 LBS | TEMPERATURE: 96.6 F

## 2018-02-12 DIAGNOSIS — M79.605 BILATERAL LEG PAIN: ICD-10-CM

## 2018-02-12 DIAGNOSIS — M51.36 DEGENERATIVE DISC DISEASE, LUMBAR: Primary | ICD-10-CM

## 2018-02-12 DIAGNOSIS — M79.604 BILATERAL LEG PAIN: ICD-10-CM

## 2018-02-12 DIAGNOSIS — M51.36 BULGING LUMBAR DISC: ICD-10-CM

## 2018-02-12 PROCEDURE — 99213 OFFICE O/P EST LOW 20 MIN: CPT | Performed by: NEUROLOGICAL SURGERY

## 2018-02-12 RX ORDER — GABAPENTIN 300 MG/1
300 CAPSULE ORAL 4 TIMES DAILY
Qty: 120 CAPSULE | Refills: 0 | Status: SHIPPED | OUTPATIENT
Start: 2018-02-12 | End: 2018-05-07 | Stop reason: SDUPTHER

## 2018-02-12 RX ORDER — CARISOPRODOL 350 MG/1
350 TABLET ORAL 3 TIMES DAILY
Qty: 30 TABLET | Refills: 0 | Status: SHIPPED | OUTPATIENT
Start: 2018-02-12 | End: 2018-02-12 | Stop reason: SDUPTHER

## 2018-02-12 RX ORDER — HYDROCODONE BITARTRATE AND ACETAMINOPHEN 7.5; 325 MG/1; MG/1
1 TABLET ORAL 2 TIMES DAILY
Qty: 30 TABLET | Refills: 0 | Status: SHIPPED | OUTPATIENT
Start: 2018-02-12 | End: 2018-05-07 | Stop reason: SDUPTHER

## 2018-02-12 RX ORDER — CARISOPRODOL 350 MG/1
350 TABLET ORAL 3 TIMES DAILY
Qty: 90 TABLET | Refills: 0 | Status: SHIPPED | OUTPATIENT
Start: 2018-02-12 | End: 2018-05-07 | Stop reason: SDUPTHER

## 2018-02-12 RX ORDER — MELOXICAM 7.5 MG/1
7.5 TABLET ORAL 2 TIMES DAILY
Qty: 60 TABLET | Refills: 0 | Status: SHIPPED | OUTPATIENT
Start: 2018-02-12 | End: 2018-05-07 | Stop reason: SDUPTHER

## 2018-02-12 RX ORDER — GABAPENTIN 300 MG/1
300 CAPSULE ORAL 4 TIMES DAILY
Qty: 21 CAPSULE | Refills: 0 | Status: SHIPPED | OUTPATIENT
Start: 2018-02-12 | End: 2018-02-12 | Stop reason: SDUPTHER

## 2018-02-12 NOTE — PROGRESS NOTES
Eduin Gaxiola  1965  [id number]                       CURRENT WORKING DIAGNOSIS:  [Herniated intervertebral disc ]         MEDICAL HISTORY SINCE LAST ENCOUNTER:  [This is a 52-year-old male who I initially saw 1- with complaint of pain in his back rating into his right lower extremity related to a work injury he sustained 12-.  Based on that encounter, review of his diagnostic studies I recommended an intensive course of physical therapy and prescribed medications.  His diagnostic studies at that time showed the presence of disc protrusion L3-L4 (right) and disc protrusion L5-S1 (left).  His Workmen's Compensation insurance has denied his physical therapy.  He reports now for reevaluation.  His symptoms are unchanged.  He has severe pain in his right leg.  He has some pain in his left hip and his left leg but the predominance of his symptoms are located in the right thigh. ]           Past Medical History:   Diagnosis Date   • Sciatica               Past Surgical History:   Procedure Laterality Date   • ARM REPLANTATION Right    • HERNIA REPAIR                Family History   Problem Relation Age of Onset   • Heart disease Mother    • Cancer Father               Social History     Social History   • Marital status: Single     Spouse name: N/A   • Number of children: N/A   • Years of education: N/A     Occupational History   • Not on file.     Social History Main Topics   • Smoking status: Current Every Day Smoker     Packs/day: 0.50     Years: 40.00     Types: Cigarettes   • Smokeless tobacco: Never Used   • Alcohol use No   • Drug use: No   • Sexual activity: Defer     Other Topics Concern   • Not on file     Social History Narrative              Allergies   Allergen Reactions   • Penicillins Anaphylaxis              Current Outpatient Prescriptions:   •  gabapentin (NEURONTIN) 300 MG capsule, Take 1 capsule by mouth 3 (Three) Times a Day., Disp: 21 capsule, Rfl: 0  •  methocarbamol (ROBAXIN)  750 MG tablet, Take 1 tablet by mouth 2 (Two) Times a Day for 30 days., Disp: 30 tablet, Rfl: 0  •  nabumetone (RELAFEN) 750 MG tablet, Take 1 tablet by mouth 2 (Two) Times a Day As Needed for Mild Pain ., Disp: 60 tablet, Rfl: 0  •  naproxen (NAPROSYN) 500 MG tablet, Take 1 tablet by mouth 2 (Two) Times a Day With Meals., Disp: 10 tablet, Rfl: 0  •  predniSONE (DELTASONE) 10 MG tablet, 40mg PO daily for 5 days, then 20mg PO daily for 5 days, then 10mg PO daily for 5 days, Disp: 35 tablet, Rfl: 0  •  promethazine (PHENERGAN) 25 MG tablet, Take 1 tablet by mouth Every 6 (Six) Hours As Needed for Nausea or Vomiting., Disp: 12 tablet, Rfl: 0  •  traMADol (ULTRAM) 50 MG tablet, Take 1 tablet by mouth Every 4 (Four) Hours As Needed for Moderate Pain ., Disp: 60 tablet, Rfl: 0         Review of Systems   Constitutional: Negative for activity change, appetite change, chills, diaphoresis, fatigue, fever and unexpected weight change.   HENT: Negative for congestion, dental problem, drooling, ear discharge, ear pain, facial swelling, hearing loss, mouth sores, nosebleeds, postnasal drip, rhinorrhea, sinus pressure, sneezing, sore throat, tinnitus, trouble swallowing and voice change.    Eyes: Negative for photophobia, pain, discharge, redness, itching and visual disturbance.   Respiratory: Negative for apnea, cough, choking, chest tightness, shortness of breath, wheezing and stridor.    Cardiovascular: Negative for chest pain, palpitations and leg swelling.   Gastrointestinal: Negative for abdominal distention, abdominal pain, anal bleeding, blood in stool, constipation, diarrhea, nausea, rectal pain and vomiting.   Endocrine: Negative for cold intolerance, heat intolerance, polydipsia, polyphagia and polyuria.   Genitourinary: Negative for decreased urine volume, difficulty urinating, dysuria, enuresis, flank pain, frequency, genital sores, hematuria and urgency.   Musculoskeletal: Positive for arthralgias and back pain.  "Negative for gait problem, joint swelling, myalgias, neck pain and neck stiffness.   Skin: Negative for color change, pallor, rash and wound.   Allergic/Immunologic: Negative for environmental allergies, food allergies and immunocompromised state.   Neurological: Positive for weakness and numbness. Negative for dizziness, tremors, seizures, syncope, facial asymmetry, speech difficulty, light-headedness and headaches.   Hematological: Negative for adenopathy. Does not bruise/bleed easily.   Psychiatric/Behavioral: Negative for agitation, behavioral problems, confusion, decreased concentration, dysphoric mood, hallucinations, self-injury, sleep disturbance and suicidal ideas. The patient is not nervous/anxious and is not hyperactive.                Vitals:    02/12/18 1146   BP: 128/82   BP Location: Right arm   Patient Position: Sitting   Temp: 96.6 °F (35.9 °C)   TempSrc: Temporal Artery    Weight: 84.8 kg (187 lb)   Height: 185.4 cm (72.99\")               EXAMINATION: He has limitation or range of motion of the lumbar spine including flexion extension lateral rotation lateral bending.  Straight leg raising is mildly positive on the right and negative on the left.  He is does not have evidence of weakness sensory loss or reflex asymmetry.            MEDICAL DECISION MAKING: He has not shown any improvement yet has really not been treated at all because of the restrictions of his Workmen's Compensation carrier.           ASSESSMENT/DISPOSITION: I have scheduled additional diagnostic studies to include myelography, post-myelography CT scanning and EMG and NCV.  He is unable to work at this time.  Unfortunately the longer he stays away from proper management is a consequence of issues with his Workmen's Compensation carrier the more difficulty will be for him to improve and to return to work.              I APPRECIATE THE OPPORTUNITY OF THIS REFERRAL. PLEASE CALL IF ANY  QUESTIONS [phone]    Scribed for Lucio VALERA " MD Josesito by Elvin Arnold CMA. 2/12/2018  12:14 PM    I have read and concur with the information provided by the scribe.  Lucio Bellamy MD

## 2018-05-07 RX ORDER — MELOXICAM 7.5 MG/1
7.5 TABLET ORAL 2 TIMES DAILY
Qty: 60 TABLET | Refills: 0 | Status: SHIPPED | OUTPATIENT
Start: 2018-05-07 | End: 2022-05-03

## 2018-05-07 RX ORDER — CARISOPRODOL 350 MG/1
350 TABLET ORAL 3 TIMES DAILY
Qty: 90 TABLET | Refills: 0 | Status: SHIPPED | OUTPATIENT
Start: 2018-05-07 | End: 2022-05-03

## 2018-05-07 RX ORDER — HYDROCODONE BITARTRATE AND ACETAMINOPHEN 7.5; 325 MG/1; MG/1
1 TABLET ORAL 2 TIMES DAILY
Qty: 30 TABLET | Refills: 0 | Status: SHIPPED | OUTPATIENT
Start: 2018-05-07 | End: 2022-05-03

## 2018-05-07 RX ORDER — GABAPENTIN 300 MG/1
300 CAPSULE ORAL 4 TIMES DAILY
Qty: 120 CAPSULE | Refills: 0 | Status: SHIPPED | OUTPATIENT
Start: 2018-05-07

## 2018-05-08 DIAGNOSIS — M79.605 BILATERAL LEG PAIN: Primary | ICD-10-CM

## 2018-05-08 DIAGNOSIS — M51.36 BULGING LUMBAR DISC: ICD-10-CM

## 2018-05-08 DIAGNOSIS — M51.36 DEGENERATIVE DISC DISEASE, LUMBAR: ICD-10-CM

## 2018-05-08 DIAGNOSIS — M79.604 BILATERAL LEG PAIN: Primary | ICD-10-CM

## 2018-05-08 DIAGNOSIS — M79.605 BILATERAL LEG PAIN: ICD-10-CM

## 2018-05-08 DIAGNOSIS — M51.36 DEGENERATIVE DISC DISEASE, LUMBAR: Primary | ICD-10-CM

## 2018-05-08 DIAGNOSIS — M79.604 BILATERAL LEG PAIN: ICD-10-CM

## 2018-05-08 RX ORDER — DEXAMETHASONE 4 MG/1
8 TABLET ORAL TAKE AS DIRECTED
Qty: 2 TABLET | Refills: 0 | Status: SHIPPED | OUTPATIENT
Start: 2018-05-08 | End: 2018-05-29 | Stop reason: RX

## 2018-05-08 RX ORDER — DIPHENHYDRAMINE HCL 25 MG
50 CAPSULE ORAL TAKE AS DIRECTED
Qty: 2 CAPSULE | Refills: 0 | Status: SHIPPED | OUTPATIENT
Start: 2018-05-08

## 2018-05-17 ENCOUNTER — TELEPHONE (OUTPATIENT)
Dept: NEUROSURGERY | Facility: CLINIC | Age: 53
End: 2018-05-17

## 2018-05-17 DIAGNOSIS — M79.604 BILATERAL LEG PAIN: ICD-10-CM

## 2018-05-17 DIAGNOSIS — M79.605 BILATERAL LEG PAIN: ICD-10-CM

## 2018-05-17 DIAGNOSIS — M51.36 DEGENERATIVE DISC DISEASE, LUMBAR: Primary | ICD-10-CM

## 2018-05-17 DIAGNOSIS — M51.36 BULGING LUMBAR DISC: ICD-10-CM

## 2018-05-17 NOTE — TELEPHONE ENCOUNTER
Called pt, he wanted to know where somewhere was near him to get the cane, said he was near [place] [facility] on [address] Gave them their phone number and adv that I would fax the order to them, he understood.     Faxed cane order to [facility] @ [phone]

## 2018-05-17 NOTE — TELEPHONE ENCOUNTER
Provider:  Josesito  Caller: patient  Time of call:  1:32   Phone #:  [phone]  Surgery:  no  Surgery Date:    Last visit:  02/12/18   Next visit: None    AMBER:         Reason for call:     Patient called and asked if we could order him a cane.

## 2018-05-25 ENCOUNTER — TRANSCRIBE ORDERS (OUTPATIENT)
Dept: ADMINISTRATIVE | Facility: HOSPITAL | Age: 53
End: 2018-05-25

## 2018-05-25 ENCOUNTER — HOSPITAL ENCOUNTER (OUTPATIENT)
Dept: NEUROLOGY | Facility: HOSPITAL | Age: 53
Discharge: HOME | End: 2018-05-25
Attending: NEUROLOGICAL SURGERY

## 2018-05-25 ENCOUNTER — HOSPITAL ENCOUNTER (OUTPATIENT)
Dept: GENERAL RADIOLOGY | Facility: HOSPITAL | Age: 53
Discharge: HOME | End: 2018-05-25
Attending: NEUROLOGICAL SURGERY | Admitting: NEUROLOGICAL SURGERY

## 2018-05-25 VITALS
RESPIRATION RATE: 20 BRPM | BODY MASS INDEX: 24.52 KG/M2 | OXYGEN SATURATION: 99 % | HEIGHT: 73 IN | TEMPERATURE: 97.7 F | SYSTOLIC BLOOD PRESSURE: 96 MMHG | DIASTOLIC BLOOD PRESSURE: 67 MMHG | WEIGHT: 185 LBS | HEART RATE: 61 BPM

## 2018-05-25 DIAGNOSIS — M51.36 DEGENERATIVE DISC DISEASE, LUMBAR: Primary | ICD-10-CM

## 2018-05-25 DIAGNOSIS — M51.36 BULGING LUMBAR DISC: ICD-10-CM

## 2018-05-25 DIAGNOSIS — M79.605 BILATERAL LEG PAIN: ICD-10-CM

## 2018-05-25 DIAGNOSIS — M79.604 BILATERAL LEG PAIN: ICD-10-CM

## 2018-05-25 PROCEDURE — 95886 MUSC TEST DONE W/N TEST COMP: CPT

## 2018-05-25 PROCEDURE — 95910 NRV CNDJ TEST 7-8 STUDIES: CPT

## 2018-05-25 RX ORDER — DIAZEPAM 5 MG/1
10 TABLET ORAL ONCE AS NEEDED
Status: DISCONTINUED | OUTPATIENT
Start: 2018-05-25 | End: 2018-05-28 | Stop reason: HOSPADM

## 2018-05-25 RX ORDER — DIPHENHYDRAMINE HCL 50 MG
50 CAPSULE ORAL ONCE
Status: DISCONTINUED | OUTPATIENT
Start: 2018-05-25 | End: 2018-05-28 | Stop reason: HOSPADM
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
## 2018-05-29 ENCOUNTER — HOSPITAL ENCOUNTER (EMERGENCY)
Facility: HOSPITAL | Age: 53
Discharge: HOME | End: 2018-05-29
Attending: EMERGENCY MEDICINE | Admitting: EMERGENCY MEDICINE

## 2018-05-29 ENCOUNTER — APPOINTMENT (OUTPATIENT)
Dept: GENERAL RADIOLOGY | Facility: HOSPITAL | Age: 53
End: 2018-05-29

## 2018-05-29 VITALS
HEART RATE: 79 BPM | DIASTOLIC BLOOD PRESSURE: 61 MMHG | HEIGHT: 73 IN | WEIGHT: 185 LBS | RESPIRATION RATE: 16 BRPM | BODY MASS INDEX: 24.52 KG/M2 | SYSTOLIC BLOOD PRESSURE: 99 MMHG | TEMPERATURE: 97.7 F | OXYGEN SATURATION: 96 %

## 2018-05-29 DIAGNOSIS — M54.31 SCIATICA OF RIGHT SIDE: Primary | ICD-10-CM

## 2018-05-29 DIAGNOSIS — R07.9 CHEST PAIN, UNSPECIFIED TYPE: ICD-10-CM

## 2018-05-29 LAB
ALBUMIN SERPL-MCNC: 4.3 G/DL (ref 3.2–4.8)
ALBUMIN/GLOB SERPL: 1.7 G/DL (ref 1.5–2.5)
ALP SERPL-CCNC: 51 U/L (ref 25–100)
ALT SERPL W P-5'-P-CCNC: 57 U/L (ref 7–40)
ANION GAP SERPL CALCULATED.3IONS-SCNC: 4 MMOL/L (ref 3–11)
AST SERPL-CCNC: 35 U/L (ref 0–33)
BASOPHILS # BLD AUTO: 0.04 10*3/MM3 (ref 0–0.2)
BASOPHILS NFR BLD AUTO: 0.4 % (ref 0–1)
BILIRUB SERPL-MCNC: 0.4 MG/DL (ref 0.3–1.2)
BUN BLD-MCNC: 8 MG/DL (ref 9–23)
BUN/CREAT SERPL: 10 (ref 7–25)
CALCIUM SPEC-SCNC: 8.9 MG/DL (ref 8.7–10.4)
CHLORIDE SERPL-SCNC: 112 MMOL/L (ref 99–109)
CO2 SERPL-SCNC: 27 MMOL/L (ref 20–31)
CREAT BLD-MCNC: 0.8 MG/DL (ref 0.6–1.3)
DEPRECATED RDW RBC AUTO: 50.9 FL (ref 37–54)
EOSINOPHIL # BLD AUTO: 0.08 10*3/MM3 (ref 0–0.3)
EOSINOPHIL NFR BLD AUTO: 0.8 % (ref 0–3)
ERYTHROCYTE [DISTWIDTH] IN BLOOD BY AUTOMATED COUNT: 14.7 % (ref 11.3–14.5)
GFR SERPL CREATININE-BSD FRML MDRD: 124 ML/MIN/1.73
GLOBULIN UR ELPH-MCNC: 2.6 GM/DL
GLUCOSE BLD-MCNC: 90 MG/DL (ref 70–100)
HCT VFR BLD AUTO: 43.7 % (ref 38.9–50.9)
HGB BLD-MCNC: 14.7 G/DL (ref 13.1–17.5)
HOLD SPECIMEN: NORMAL
HOLD SPECIMEN: NORMAL
IMM GRANULOCYTES # BLD: 0.02 10*3/MM3 (ref 0–0.03)
IMM GRANULOCYTES NFR BLD: 0.2 % (ref 0–0.6)
INR PPP: 1.06 (ref 0.91–1.09)
LYMPHOCYTES # BLD AUTO: 4.25 10*3/MM3 (ref 0.6–4.8)
LYMPHOCYTES NFR BLD AUTO: 40.1 % (ref 24–44)
MCH RBC QN AUTO: 32 PG (ref 27–31)
MCHC RBC AUTO-ENTMCNC: 33.6 G/DL (ref 32–36)
MCV RBC AUTO: 95.2 FL (ref 80–99)
MONOCYTES # BLD AUTO: 0.51 10*3/MM3 (ref 0–1)
MONOCYTES NFR BLD AUTO: 4.8 % (ref 0–12)
NEUTROPHILS # BLD AUTO: 5.71 10*3/MM3 (ref 1.5–8.3)
NEUTROPHILS NFR BLD AUTO: 53.9 % (ref 41–71)
PLATELET # BLD AUTO: 259 10*3/MM3 (ref 150–450)
PMV BLD AUTO: 10.7 FL (ref 6–12)
POTASSIUM BLD-SCNC: 3.9 MMOL/L (ref 3.5–5.5)
PROT SERPL-MCNC: 6.9 G/DL (ref 5.7–8.2)
PROTHROMBIN TIME: 11.1 SECONDS (ref 9.6–11.5)
RBC # BLD AUTO: 4.59 10*6/MM3 (ref 4.2–5.76)
SODIUM BLD-SCNC: 143 MMOL/L (ref 132–146)
TROPONIN I SERPL-MCNC: 0 NG/ML (ref 0–0.07)
TROPONIN I SERPL-MCNC: 0.01 NG/ML (ref 0–0.07)
WBC NRBC COR # BLD: 10.59 10*3/MM3 (ref 3.5–10.8)
WHOLE BLOOD HOLD SPECIMEN: NORMAL
WHOLE BLOOD HOLD SPECIMEN: NORMAL

## 2018-05-29 PROCEDURE — 93005 ELECTROCARDIOGRAM TRACING: CPT | Performed by: EMERGENCY MEDICINE

## 2018-05-29 PROCEDURE — 80053 COMPREHEN METABOLIC PANEL: CPT | Performed by: EMERGENCY MEDICINE

## 2018-05-29 PROCEDURE — 99284 EMERGENCY DEPT VISIT MOD MDM: CPT

## 2018-05-29 PROCEDURE — 85025 COMPLETE CBC W/AUTO DIFF WBC: CPT | Performed by: EMERGENCY MEDICINE

## 2018-05-29 PROCEDURE — 96374 THER/PROPH/DIAG INJ IV PUSH: CPT

## 2018-05-29 PROCEDURE — 36415 COLL VENOUS BLD VENIPUNCTURE: CPT

## 2018-05-29 PROCEDURE — 85610 PROTHROMBIN TIME: CPT | Performed by: EMERGENCY MEDICINE

## 2018-05-29 PROCEDURE — 84484 ASSAY OF TROPONIN QUANT: CPT

## 2018-05-29 PROCEDURE — 25010000002 METHYLPREDNISOLONE PER 125 MG: Performed by: EMERGENCY MEDICINE

## 2018-05-29 PROCEDURE — 71045 X-RAY EXAM CHEST 1 VIEW: CPT

## 2018-05-29 RX ORDER — SODIUM CHLORIDE 0.9 % (FLUSH) 0.9 %
10 SYRINGE (ML) INJECTION AS NEEDED
Status: DISCONTINUED | OUTPATIENT
Start: 2018-05-29 | End: 2018-05-29 | Stop reason: HOSPADM

## 2018-05-29 RX ORDER — METHYLPREDNISOLONE SODIUM SUCCINATE 125 MG/2ML
125 INJECTION, POWDER, LYOPHILIZED, FOR SOLUTION INTRAMUSCULAR; INTRAVENOUS ONCE
Status: COMPLETED | OUTPATIENT
Start: 2018-05-29 | End: 2018-05-29

## 2018-05-29 RX ORDER — PREDNISONE 50 MG/1
50 TABLET ORAL DAILY
Qty: 5 TABLET | Refills: 0 | Status: SHIPPED | OUTPATIENT
Start: 2018-05-29

## 2018-05-29 RX ORDER — HYDROCODONE BITARTRATE AND ACETAMINOPHEN 5; 325 MG/1; MG/1
1 TABLET ORAL ONCE
Status: COMPLETED | OUTPATIENT
Start: 2018-05-29 | End: 2018-05-29

## 2018-05-29 RX ORDER — METHYLPREDNISOLONE SODIUM SUCCINATE 125 MG/2ML
125 INJECTION, POWDER, LYOPHILIZED, FOR SOLUTION INTRAMUSCULAR; INTRAVENOUS ONCE
Status: DISCONTINUED | OUTPATIENT
Start: 2018-05-29 | End: 2018-05-29

## 2018-05-29 RX ORDER — METHYLPREDNISOLONE SODIUM SUCCINATE 125 MG/2ML
80 INJECTION, POWDER, LYOPHILIZED, FOR SOLUTION INTRAMUSCULAR; INTRAVENOUS ONCE
Status: DISCONTINUED | OUTPATIENT
Start: 2018-05-29 | End: 2018-05-29

## 2018-05-29 RX ORDER — ASPIRIN 81 MG/1
324 TABLET, CHEWABLE ORAL ONCE
Status: DISCONTINUED | OUTPATIENT
Start: 2018-05-29 | End: 2018-05-29

## 2018-05-29 RX ADMIN — METHYLPREDNISOLONE SODIUM SUCCINATE 125 MG: 125 INJECTION, POWDER, FOR SOLUTION INTRAMUSCULAR; INTRAVENOUS at 02:46

## 2018-05-29 RX ADMIN — HYDROCODONE BITARTRATE AND ACETAMINOPHEN 1 TABLET: 5; 325 TABLET ORAL at 02:47

## 2018-05-30 ENCOUNTER — HOSPITAL ENCOUNTER (OUTPATIENT)
Dept: GENERAL RADIOLOGY | Facility: HOSPITAL | Age: 53
Discharge: HOME | End: 2018-05-30

## 2018-05-30 ENCOUNTER — HOSPITAL ENCOUNTER (OUTPATIENT)
Dept: CT IMAGING | Facility: HOSPITAL | Age: 53
Discharge: HOME | End: 2018-05-30

## 2018-05-30 ENCOUNTER — HOSPITAL ENCOUNTER (OUTPATIENT)
Dept: GENERAL RADIOLOGY | Facility: HOSPITAL | Age: 53
Discharge: HOME | End: 2018-05-30
Attending: NEUROLOGICAL SURGERY | Admitting: NEUROLOGICAL SURGERY

## 2018-05-30 VITALS
HEIGHT: 73 IN | HEART RATE: 70 BPM | SYSTOLIC BLOOD PRESSURE: 105 MMHG | DIASTOLIC BLOOD PRESSURE: 65 MMHG | RESPIRATION RATE: 18 BRPM | WEIGHT: 185 LBS | BODY MASS INDEX: 24.52 KG/M2 | OXYGEN SATURATION: 98 % | TEMPERATURE: 97.6 F

## 2018-05-30 DIAGNOSIS — M51.36 DEGENERATIVE DISC DISEASE, LUMBAR: ICD-10-CM

## 2018-05-30 DIAGNOSIS — M79.605 BILATERAL LEG PAIN: ICD-10-CM

## 2018-05-30 DIAGNOSIS — M79.604 BILATERAL LEG PAIN: ICD-10-CM

## 2018-05-30 DIAGNOSIS — M51.36 BULGING LUMBAR DISC: ICD-10-CM

## 2018-05-30 PROCEDURE — 72120 X-RAY BEND ONLY L-S SPINE: CPT

## 2018-05-30 PROCEDURE — 63710000001 DIPHENHYDRAMINE PER 50 MG: Performed by: NEUROLOGICAL SURGERY

## 2018-05-30 PROCEDURE — 72265 MYELOGRAPHY L-S SPINE: CPT

## 2018-05-30 PROCEDURE — 72132 CT LUMBAR SPINE W/DYE: CPT

## 2018-05-30 PROCEDURE — 0 IOPAMIDOL 41 % SOLUTION: Performed by: NEUROLOGICAL SURGERY

## 2018-05-30 RX ORDER — HYDROCODONE BITARTRATE AND ACETAMINOPHEN 7.5; 325 MG/1; MG/1
1 TABLET ORAL ONCE AS NEEDED
Status: COMPLETED | OUTPATIENT
Start: 2018-05-30 | End: 2018-05-30

## 2018-05-30 RX ORDER — LIDOCAINE HYDROCHLORIDE 10 MG/ML
5 INJECTION, SOLUTION INFILTRATION; PERINEURAL ONCE
Status: COMPLETED | OUTPATIENT
Start: 2018-05-30 | End: 2018-05-30

## 2018-05-30 RX ORDER — DIAZEPAM 5 MG/1
10 TABLET ORAL ONCE
Status: COMPLETED | OUTPATIENT
Start: 2018-05-30 | End: 2018-05-30

## 2018-05-30 RX ORDER — DIPHENHYDRAMINE HCL 50 MG
50 CAPSULE ORAL ONCE
Status: COMPLETED | OUTPATIENT
Start: 2018-05-30 | End: 2018-05-30

## 2018-05-30 RX ORDER — HYDROCODONE BITARTRATE AND ACETAMINOPHEN 7.5; 325 MG/1; MG/1
1 TABLET ORAL EVERY 6 HOURS PRN
Status: DISCONTINUED | OUTPATIENT
Start: 2018-05-30 | End: 2018-05-31 | Stop reason: HOSPADM

## 2018-05-30 RX ADMIN — IOPAMIDOL 20 ML: 408 INJECTION, SOLUTION INTRATHECAL at 09:59

## 2018-05-30 RX ADMIN — LIDOCAINE HYDROCHLORIDE 5 ML: 10 INJECTION, SOLUTION INFILTRATION; PERINEURAL at 09:59

## 2018-05-30 RX ADMIN — DIAZEPAM 10 MG: 5 TABLET ORAL at 07:20

## 2018-05-30 RX ADMIN — HYDROCODONE BITARTRATE AND ACETAMINOPHEN 1 TABLET: 7.5; 325 TABLET ORAL at 11:01

## 2018-05-30 RX ADMIN — DIPHENHYDRAMINE HYDROCHLORIDE 50 MG: 50 CAPSULE ORAL at 07:21

## 2018-05-30 NOTE — PROGRESS NOTES
Continued Stay Note  [facility]     Patient Name: Eduin Gaxiola  MRN: [id number]  Today's Date: 5/30/2018    Admit Date: 5/30/2018          Discharge Plan     Row Name 05/30/18 1142       Plan    Plan Social work spoke with Mr. Gaxiola and provided him with a cab voucher for transportation back to the [facility]. Social work called [employer], [phone] and faxed the information that they needed to [phone] and they are going to deliver the cane to the Interventional Radiology at the hospital today.    Patient/Family in Agreement with Plan yes              Discharge Codes    No documentation.           RICH Leo

## 2018-05-30 NOTE — PROGRESS NOTES
POST MYELOGRAM NOTE:      This is a 52-year-old male who I initially saw 1- with complaint of pain in his back rating into his right lower extremity related to a work injury he sustained 12-.  Based on that encounter, review of his diagnostic studies I recommended an intensive course of physical therapy and prescribed medications.  His diagnostic studies at that time showed the presence of disc protrusion L3-L4 (right) and disc protrusion L5-S1 (left).  His Workmen's Compensation insurance has denied his physical therapy.  He reports now for reevaluation.  His symptoms are unchanged.  He has severe pain in his right leg.  He has some pain in his left hip and his left leg but the predominance of his symptoms are located in the right thigh    EMG/NCV IMPRESSION:      Normal NCS/EMG of both legs and back    POST MYELOGRAM/C SCAN:  In my opinion the myelogram and post myelogram CT scan did not show a large herniated disc, spinal or lateral recess stenosis.    RECOMMENDATION:    1.  There is no indication for surgical intervention in my opinion.    2.  I would recommend pain management.    3.  In my opinion he is sustaining a musculoligamentous strain.  A functional capacity evaluation would be helpful to determine his work capability and status.    He is been released from our care.  I have provided a prescription of Norco 7.5/325 4 times a day for 3 days.  He may receive additional medications from his primary care provider or pain management service.

## 2018-05-30 NOTE — POST-PROCEDURE NOTE
Radiology Procedure    Pre-procedure: procedure, risks discussed with patient. Patient indicated understanding and consented to procedure     Procedure Performed: lumbar myelogram     IV Sedation and/or Anesthesia:  No    Complications: none    Preliminary Findings: pending    Specimen Removed: none    Estimated Blood Loss:  0ml    Post-Procedure Diagnosis: pending    Post-Procedure Plan: ct L spine, encourage fluids, bed rest x 2 hours    Standard Discharge Instructions Given:yes     RAVEN Madison  05/30/18  9:51 AM

## 2018-05-31 ENCOUNTER — TELEPHONE (OUTPATIENT)
Dept: INTERVENTIONAL RADIOLOGY/VASCULAR | Facility: HOSPITAL | Age: 53
End: 2018-05-31

## 2018-05-31 NOTE — TELEPHONE ENCOUNTER
@[id number])@    Other Comments:

## 2018-06-08 ENCOUNTER — TELEPHONE (OUTPATIENT)
Dept: NEUROSURGERY | Facility: CLINIC | Age: 53
End: 2018-06-08

## 2018-06-08 ENCOUNTER — HOSPITAL ENCOUNTER (EMERGENCY)
Facility: HOSPITAL | Age: 53
Discharge: HOME | End: 2018-06-08
Attending: EMERGENCY MEDICINE | Admitting: EMERGENCY MEDICINE

## 2018-06-08 ENCOUNTER — APPOINTMENT (OUTPATIENT)
Dept: GENERAL RADIOLOGY | Facility: HOSPITAL | Age: 53
End: 2018-06-08

## 2018-06-08 VITALS
TEMPERATURE: 97.9 F | HEIGHT: 73 IN | DIASTOLIC BLOOD PRESSURE: 75 MMHG | RESPIRATION RATE: 18 BRPM | HEART RATE: 80 BPM | SYSTOLIC BLOOD PRESSURE: 118 MMHG | WEIGHT: 185 LBS | OXYGEN SATURATION: 100 % | BODY MASS INDEX: 24.52 KG/M2

## 2018-06-08 DIAGNOSIS — M54.31 SCIATICA OF RIGHT SIDE: Primary | ICD-10-CM

## 2018-06-08 PROCEDURE — 72100 X-RAY EXAM L-S SPINE 2/3 VWS: CPT

## 2018-06-08 PROCEDURE — 99283 EMERGENCY DEPT VISIT LOW MDM: CPT

## 2018-06-08 RX ORDER — METHYLPREDNISOLONE 4 MG/1
TABLET ORAL
Qty: 21 TABLET | Refills: 0 | Status: SHIPPED | OUTPATIENT
Start: 2018-06-08 | End: 2022-05-03

## 2018-06-08 NOTE — DISCHARGE INSTRUCTIONS
Follow up with one of the [facility] physician groups below to setup primary care. If you have trouble following up, please call Mireya Rudolph, our transitional care nurse, at [phone].    (Dr. Levi, Dr. Petty, Dr. Matias, and Dr. Dsouza.)  [facility], Primary Care, [phone], [address], [place] [address]    [facility], Primary Care, [phone], [address] [place], [address] [facility]) [facility] Medical [facility], Primary Care, [phone], [address] [place], [address] [facility], Primary Care, [phone], [address] [place], [address]     [place] [address] [facility] Medical [facility], Primary Care, [phone], [address], [address] [place], [address]    [place] [address] [facility], Primary Care, [phone], [address]    [place], [address] [facility], Primary Care, [phone], [address] [place], [address] [facility], Primary Care, [phone], [address] [place], [address] [facility]) [facility] Medical [facility], Primary Care, [phone], [address] [place], [address] [facility] Medical [facility], Primary Care, [phone], [address] [place], [address] [facility], Primary Care, [phone], [address], [place], [address]     [facility], Primary Care, [phone], [address] [address]  Follow up with one of the physician centers below to setup primary care.    [facility], [phone], [address], [place], [facility]    [facility], [phone], [address] [facility], [address] [place] [address], [phone], [address] [place], [facility];     [facility], [phone], [address] [address]

## 2018-06-08 NOTE — ED PROVIDER NOTES
Subjective   Patient has a history of chronic back pain stems from a worker's comp injury back in December 2017.  Patient's been seen by Dr. Bellamy and states that his pain is gotten worse over the past several days after he felt a pop in his lower back.  Patient ports did not have any trauma.  He recently been in over to tie shoes.  Denies a loss of bowel or bladder control.  He's had no dysuria frequency urgency or hematuria associated with this.  Patient denies any fevers or chills.  He's had no difficulty starting or stopping his stream.  No numbness in the perianal or groin region.  Patient reports he called Dr. Bellamy's office today and they told him to come to the emergency department.  Patient states he does not have a family doctor.  He has a Worker's Comp. doctor and he is a doctor from his .  He has Dr. Bellamy.  It appears Dr. Gonzalez is one of the back specialist the other one is Dr. Balderas.        History provided by:  Patient   used: No    Back Pain   Location:  Lumbar spine  Quality:  Shooting  Radiates to:  R posterior upper leg and R thigh  Pain severity:  Severe  Onset quality:  Gradual  Timing:  Constant  Progression:  Waxing and waning  Chronicity:  Chronic  Context: occupational injury    Context: not emotional stress, not jumping from heights and not recent injury    Relieved by:  Nothing  Worsened by:  Movement  Ineffective treatments:  Ibuprofen  Associated symptoms: leg pain    Associated symptoms: no abdominal pain, no bladder incontinence, no bowel incontinence, no chest pain, no dysuria, no fever, no numbness, no paresthesias, no perianal numbness, no tingling and no weakness    Risk factors: no lack of exercise, no recent surgery and no steroid use        Review of Systems   Constitutional: Negative for chills and fever.   Respiratory: Negative for chest tightness, shortness of breath and wheezing.    Cardiovascular: Negative for chest pain and palpitations.  "  Gastrointestinal: Negative for abdominal pain and bowel incontinence.   Genitourinary: Negative for bladder incontinence, dysuria, frequency and urgency.   Musculoskeletal: Positive for back pain.   Skin: Negative for pallor and rash.   Neurological: Negative for tingling, weakness, numbness and paresthesias.   Psychiatric/Behavioral: Negative.    All other systems reviewed and are negative.      Past Medical History:   Diagnosis Date   • GERD (gastroesophageal reflux disease)    • Headache     \"KEEPS A HEADACHE   • Sciatica        Allergies   Allergen Reactions   • Penicillins Anaphylaxis       Past Surgical History:   Procedure Laterality Date   • ARM REPLANTATION Right    • HERNIA REPAIR         Family History   Problem Relation Age of Onset   • Heart disease Mother    • Cancer Father        Social History     Social History   • Marital status: Single     Social History Main Topics   • Smoking status: Current Every Day Smoker     Packs/day: 0.50     Years: 40.00     Types: Cigarettes   • Smokeless tobacco: Never Used   • Alcohol use Yes      Comment: STOPPED LAST WEEK   • Drug use: No   • Sexual activity: Defer     Other Topics Concern   • Not on file           Objective   Physical Exam   Constitutional: He is oriented to person, place, and time. He appears well-developed and well-nourished.   HENT:   Head: Normocephalic and atraumatic.   Eyes: Conjunctivae are normal. Right eye exhibits no discharge. Left eye exhibits no discharge. No scleral icterus.   Neck: Normal range of motion. Neck supple.   Musculoskeletal: He exhibits no edema, tenderness or deformity.   Patient states the pain is actually relieved with the palpation over the right SI region.  There is no spasm.  There is no open wound is no rash no lesions.  DTRs +1 the knees +1 the ankles.  Strength 5 over 5 bilateral lower extremities.   Neurological: He is alert and oriented to person, place, and time.   Skin: Skin is warm and dry. Capillary refill " takes less than 2 seconds.   Psychiatric: He has a normal mood and affect. His behavior is normal. Judgment and thought content normal.   Nursing note and vitals reviewed.      Procedures           ED Course  ED Course as of Jun 08 2215 Fri Jun 08, 2018   1529 Review the patient's records.  It appears that his myelogram was essentially unremarkable.  Dr. Bellamy has released him to follow up with his primary care doctor.  The patient states that he does not have a primary care doctor but does list the other 2 physicians.  Patient states that he was told to come to the emergency part today for his pain however reviewing the records Yoav physician assistant in her note specifically says not to go to the emergency department for back pain.  Reviewed the patient's Shaun.  I discussed this with the patient that he needs to be getting his pain medication from somewhere other than the emergency department.  States she's taken 800 mg Motrin's and has Flexeril at home without any relief.  I advised him we would not be able to supply him with more narcotic pain medicine for chronic back pain.  [BEKA]   1707 Discussed with the patient given 1 dose of Lortab here he does not have a ride home.  I give him a list of primary care doctors to follow up with.  [BEKA]   1707 Discussed the findings of his lumbar spine x-ray.  [BEKA]      ED Course User Index  [BEKA] RAVEN Anne          No results found for this or any previous visit (from the past 24 hour(s)).  Note: In addition to lab results from this visit, the labs listed above may include labs taken at another facility or during a different encounter within the last 24 hours. Please correlate lab times with ED admission and discharge times for further clarification of the services performed during this visit.    XR Spine Lumbar 2 or 3 View   Preliminary Result   1. No evidence of acute lumbar spine trauma or significant lumbar   degenerative change.   2. Moderate to  "advanced bilateral hip joint DJD.       D:  06/08/2018   E:  06/08/2018                Vitals:    06/08/18 1411 06/08/18 1457 06/08/18 1458   BP: 117/75 118/75    BP Location: Left arm     Patient Position: Sitting     Pulse: 80     Resp: 18     Temp: 97.9 °F (36.6 °C)     TempSrc: Oral     SpO2: 99%  100%   Weight: 83.9 kg (185 lb)     Height: 185.4 cm (73\")       Medications - No data to display  ECG/EMG Results (last 24 hours)     ** No results found for the last 24 hours. **                MDM  Number of Diagnoses or Management Options  Sciatica of right side: new and requires workup     Amount and/or Complexity of Data Reviewed  Clinical lab tests: reviewed and ordered  Tests in the radiology section of CPT®: reviewed and ordered  Tests in the medicine section of CPT®: ordered and reviewed  Discuss the patient with other providers: yes    Patient Progress  Patient progress: stable        Final diagnoses:   Sciatica of right side            RAVEN Anne  06/08/18 2215    "

## 2018-06-08 NOTE — TELEPHONE ENCOUNTER
Provider:  Josesito  Caller: patient came in  Time of call:   2:00  Phone #:  [phone]  Surgery:  Myelogram  Surgery Date: 05/25/18   Last visit:   same  Next visit: none    AMBER:         Reason for call:    Patient walked in to see Dr. Bellamy, however he is in surgery.  He said that for the last couple of days he has been having bad back pain.  I asked him if Dr. Bellamy gave him his myelogram results and he said he didn't, however he did tell him that he didn't need to have surgery.    Patient left here to go to the ER.

## 2018-07-16 ENCOUNTER — TELEPHONE (OUTPATIENT)
Dept: NEUROSURGERY | Facility: CLINIC | Age: 53
End: 2018-07-16

## 2018-07-16 DIAGNOSIS — M54.31 SCIATICA OF RIGHT SIDE: Primary | ICD-10-CM

## 2018-07-16 NOTE — TELEPHONE ENCOUNTER
Provider:  Josesito  Caller: patient   Time of call: 205  Phone #:  [phone]  Surgery:  Myelogram  Surgery Date: 05/25/18   Last visit: 2/12/18  Next visit: none    AMBER:       02/12/2018 Hydrocodone/Acetaminophen  325MG/7.5MG  1965 30 15 JULIETA Bellamy [facility]. [place] [address]  05/08/2018 Carisoprodol 350MG 1965 90 30 [facility] [place] [facility]  05/08/2018 Gabapentin 300MG 1965 120 30 JUL[facility]ETA Bellamy [place] [facility] [place] 1  05/08/2018 Hydrocodone/Acetaminophen  325MG/7.5MG  1965 30 15 JULIETA Bellamy [place] [facility] [place] [address]  05/30/2018 Hydrocodone/Acetaminophen  325MG/7.5MG  1965 12 3 JULIETA Bellamy [place] [facility] 30 1    Reason for call:         Pt called stating that when he was in our ER last Dr. Bellamy spoke with him briefly and stated that he had reviewed the myelogram and suggested PT be the next course of action. However he did receive an order and wants to know where he should go. He also states that he would like to be seen in the office because he will need a refill of his pain medication as the pain is quite significant. The pain he refers to is thoroughly documented in previous encounters.

## 2018-07-16 NOTE — TELEPHONE ENCOUNTER
From Dr. Bellamy' last note:    1.  There is no indication for surgical intervention in my opinion.     2.  I would recommend pain management.     3.  In my opinion he is sustaining a musculoligamentous strain.  A functional capacity evaluation would be helpful to determine his work capability and status.     He is been released from our care.  I have provided a prescription of Norco 7.5/325 4 times a day for 3 days.  He may receive additional medications from his primary care provider or pain management service.      I placed a referral to pain management. No follow up indicated at this time.

## 2018-07-17 ENCOUNTER — TELEPHONE (OUTPATIENT)
Dept: NEUROSURGERY | Facility: CLINIC | Age: 53
End: 2018-07-17

## 2018-07-17 NOTE — TELEPHONE ENCOUNTER
Returned patients call concerning an appointment for medication refills. Per Dr. Bellamy note I let patient know any refills would have to be done by his PCP.

## 2018-07-18 ENCOUNTER — HOSPITAL ENCOUNTER (EMERGENCY)
Facility: HOSPITAL | Age: 53
Discharge: LEFT BEFORE DISCHARGE | End: 2018-07-18
Attending: EMERGENCY MEDICINE

## 2018-07-18 ENCOUNTER — APPOINTMENT (OUTPATIENT)
Dept: GENERAL RADIOLOGY | Facility: HOSPITAL | Age: 53
End: 2018-07-18

## 2018-07-18 VITALS
HEIGHT: 73 IN | OXYGEN SATURATION: 95 % | SYSTOLIC BLOOD PRESSURE: 109 MMHG | RESPIRATION RATE: 18 BRPM | DIASTOLIC BLOOD PRESSURE: 97 MMHG | WEIGHT: 185 LBS | TEMPERATURE: 98.4 F | BODY MASS INDEX: 24.52 KG/M2 | HEART RATE: 95 BPM

## 2018-07-18 LAB
ALBUMIN SERPL-MCNC: 4.28 G/DL (ref 3.2–4.8)
ALBUMIN/GLOB SERPL: 1.5 G/DL (ref 1.5–2.5)
ALP SERPL-CCNC: 50 U/L (ref 25–100)
ALT SERPL W P-5'-P-CCNC: 129 U/L (ref 7–40)
ANION GAP SERPL CALCULATED.3IONS-SCNC: 6 MMOL/L (ref 3–11)
AST SERPL-CCNC: 91 U/L (ref 0–33)
BASOPHILS # BLD AUTO: 0.04 10*3/MM3 (ref 0–0.2)
BASOPHILS NFR BLD AUTO: 0.4 % (ref 0–1)
BILIRUB SERPL-MCNC: 0.4 MG/DL (ref 0.3–1.2)
BNP SERPL-MCNC: 14 PG/ML (ref 0–100)
BUN BLD-MCNC: 6 MG/DL (ref 9–23)
BUN/CREAT SERPL: 7.4 (ref 7–25)
CALCIUM SPEC-SCNC: 9.2 MG/DL (ref 8.7–10.4)
CHLORIDE SERPL-SCNC: 113 MMOL/L (ref 99–109)
CO2 SERPL-SCNC: 25 MMOL/L (ref 20–31)
CREAT BLD-MCNC: 0.81 MG/DL (ref 0.6–1.3)
DEPRECATED RDW RBC AUTO: 46 FL (ref 37–54)
EOSINOPHIL # BLD AUTO: 0.05 10*3/MM3 (ref 0–0.3)
EOSINOPHIL NFR BLD AUTO: 0.5 % (ref 0–3)
ERYTHROCYTE [DISTWIDTH] IN BLOOD BY AUTOMATED COUNT: 13.1 % (ref 11.3–14.5)
GFR SERPL CREATININE-BSD FRML MDRD: 122 ML/MIN/1.73
GLOBULIN UR ELPH-MCNC: 2.8 GM/DL
GLUCOSE BLD-MCNC: 91 MG/DL (ref 70–100)
HCT VFR BLD AUTO: 44.8 % (ref 38.9–50.9)
HGB BLD-MCNC: 14.9 G/DL (ref 13.1–17.5)
HOLD SPECIMEN: NORMAL
HOLD SPECIMEN: NORMAL
IMM GRANULOCYTES # BLD: 0.01 10*3/MM3 (ref 0–0.03)
IMM GRANULOCYTES NFR BLD: 0.1 % (ref 0–0.6)
LIPASE SERPL-CCNC: 32 U/L (ref 6–51)
LYMPHOCYTES # BLD AUTO: 3.43 10*3/MM3 (ref 0.6–4.8)
LYMPHOCYTES NFR BLD AUTO: 33.5 % (ref 24–44)
MCH RBC QN AUTO: 31.7 PG (ref 27–31)
MCHC RBC AUTO-ENTMCNC: 33.3 G/DL (ref 32–36)
MCV RBC AUTO: 95.3 FL (ref 80–99)
MONOCYTES # BLD AUTO: 0.41 10*3/MM3 (ref 0–1)
MONOCYTES NFR BLD AUTO: 4 % (ref 0–12)
NEUTROPHILS # BLD AUTO: 6.31 10*3/MM3 (ref 1.5–8.3)
NEUTROPHILS NFR BLD AUTO: 61.6 % (ref 41–71)
PLATELET # BLD AUTO: 265 10*3/MM3 (ref 150–450)
PMV BLD AUTO: 10.6 FL (ref 6–12)
POTASSIUM BLD-SCNC: 4.2 MMOL/L (ref 3.5–5.5)
PROT SERPL-MCNC: 7.1 G/DL (ref 5.7–8.2)
RBC # BLD AUTO: 4.7 10*6/MM3 (ref 4.2–5.76)
SODIUM BLD-SCNC: 144 MMOL/L (ref 132–146)
TROPONIN I SERPL-MCNC: 0 NG/ML (ref 0–0.07)
WBC NRBC COR # BLD: 10.24 10*3/MM3 (ref 3.5–10.8)
WHOLE BLOOD HOLD SPECIMEN: NORMAL
WHOLE BLOOD HOLD SPECIMEN: NORMAL

## 2018-07-18 PROCEDURE — 85025 COMPLETE CBC W/AUTO DIFF WBC: CPT | Performed by: EMERGENCY MEDICINE

## 2018-07-18 PROCEDURE — 83880 ASSAY OF NATRIURETIC PEPTIDE: CPT | Performed by: EMERGENCY MEDICINE

## 2018-07-18 PROCEDURE — 93005 ELECTROCARDIOGRAM TRACING: CPT

## 2018-07-18 PROCEDURE — 99211 OFF/OP EST MAY X REQ PHY/QHP: CPT

## 2018-07-18 PROCEDURE — 93005 ELECTROCARDIOGRAM TRACING: CPT | Performed by: EMERGENCY MEDICINE

## 2018-07-18 PROCEDURE — 84484 ASSAY OF TROPONIN QUANT: CPT

## 2018-07-18 PROCEDURE — 71045 X-RAY EXAM CHEST 1 VIEW: CPT

## 2018-07-18 PROCEDURE — 80053 COMPREHEN METABOLIC PANEL: CPT | Performed by: EMERGENCY MEDICINE

## 2018-07-18 PROCEDURE — 83690 ASSAY OF LIPASE: CPT | Performed by: EMERGENCY MEDICINE

## 2018-07-18 RX ORDER — SODIUM CHLORIDE 0.9 % (FLUSH) 0.9 %
10 SYRINGE (ML) INJECTION AS NEEDED
Status: DISCONTINUED | OUTPATIENT
Start: 2018-07-18 | End: 2018-07-18 | Stop reason: HOSPADM

## 2018-07-18 RX ORDER — ASPIRIN 81 MG/1
324 TABLET, CHEWABLE ORAL ONCE
Status: COMPLETED | OUTPATIENT
Start: 2018-07-18 | End: 2018-07-18

## 2018-07-18 RX ADMIN — ASPIRIN 81 MG 324 MG: 81 TABLET ORAL at 05:14

## 2018-07-19 ENCOUNTER — HOSPITAL ENCOUNTER (OUTPATIENT)
Dept: PHYSICAL THERAPY | Facility: HOSPITAL | Age: 53
Setting detail: THERAPIES SERIES
Discharge: HOME | End: 2018-07-19

## 2018-07-19 DIAGNOSIS — M51.36 DEGENERATIVE DISC DISEASE, LUMBAR: Primary | ICD-10-CM

## 2018-07-19 DIAGNOSIS — M51.36 BULGING LUMBAR DISC: ICD-10-CM

## 2018-07-19 DIAGNOSIS — M54.31 SCIATICA OF RIGHT SIDE: ICD-10-CM

## 2018-07-19 PROCEDURE — 97161 PT EVAL LOW COMPLEX 20 MIN: CPT | Performed by: PHYSICAL THERAPIST

## 2018-07-19 NOTE — THERAPY EVALUATION
"    Outpatient Physical Therapy Ortho Initial Evaluation  [facility]     Patient Name: Eduin Gaxiola  : 1965  MRN: [id number]  Today's Date: 2018      Visit Date: 2018    Patient Active Problem List   Diagnosis   • Degenerative disc disease, lumbar   • Bulging lumbar disc   • Bilateral leg pain        Past Medical History:   Diagnosis Date   • GERD (gastroesophageal reflux disease)    • Headache     \"KEEPS A HEADACHE   • Sciatica         Past Surgical History:   Procedure Laterality Date   • ARM REPLANTATION Right    • HERNIA REPAIR         Visit Dx:     ICD-10-CM ICD-9-CM   1. Degenerative disc disease, lumbar M51.36 722.52   2. Bulging lumbar disc M51.26 722.10   3. Sciatica of right side M54.31 724.3             Patient History     Row Name 18 0800             History    Chief Complaint Pain  -BEKA      Type of Pain Back pain  -BEKA      Date Current Problem(s) Began 18  -BEKA      Brief Description of Current Complaint Patient was at work and a log splitter through a log into his chest.  This resulted in him twisting to the right.  He has not worked since then.  He originally sustained bruising anteriorly that has resolved but he has persistant lower back pain.  Pain was previously running down the right leg but now the pain primarily runs into the right posterior hip.    -BEKA      Current Tobacco Use 1/2 ppd  -BEKA      Hand Dominance right-handed  -BEKA      Occupation/sports/leisure activities [employer] off work since Dec.  Previously worked a lot and played basketball.  -BEKA      What clinical tests have you had for this problem? Nerve Conduction Test  -BEKA      Results of Clinical Tests negative.  MRI DDD  -BEKA         Pain     Pain Location Back;Hip  -BEKA      Pain at Present 7  -BEKA      Pain at Best 7  -BEKA      Pain at Worst 10  -BEKA      Pain Frequency Constant/continuous  -BEKA      Pain Description Sharp;Stabbing;Tender  -BEKA      What Performance Factors Make the Current " Problem(s) WORSE? sitting, twisting, laying on sides  -BEKA      What Performance Factors Make the Current Problem(s) BETTER? supine lying with feet propped up (on floor with feet on couch)  -BEKA      Difficulties at work? not working  -BEKA      Difficulties with ADL's? standing, high level activities  -BEKA      Difficulties with recreational activities? sports  -BEKA         Fall Risk Assessment    Any falls in the past year: No  -BEKA         Daily Activities    Primary Language English  -BEKA      Pt Participated in POC and Goals Yes  -BEKA         Safety    Are you being hurt, hit, or frightened by anyone at home or in your life? No  -BEKA        User Key  (r) = Recorded By, (t) = Taken By, (c) = Cosigned By    Initials Name Provider Type    BEKA Raz Morris, PT Physical Therapist                PT Ortho     Row Name 07/19/18 0900       Posture/Observations    Posture/Observations Comments flexed at the hips, observable pain responses with all movements.  No bruising noted.  -BEKA       Quarter Clearing    Quarter Clearing Lower Quarter Clearing  -BEKA       DTR- Lower Quarter Clearing    Patellar tendon (L2-4) Bilateral:;2- Normal response  -BEKA    Achilles tendon (S1-2) Right:;2- Normal response;Left:;1- Minimal response  -BEKA       Neural Tension Signs- Lower Quarter Clearing    Slump Right:;Positive  -BEKA    SLR Negative  -BEKA       Pathological Reflexes- Lower Quarter Clearing    Clonus Negative  -BEKA    Dodge Negative  -BEKA       Sensory Screen for Light Touch- Lower Quarter Clearing    L2 (anterior mid thigh) Intact  -BEKA    L3 (distal anterior thigh) Intact  -BEKA    L4 (medial lower leg/foot) Intact  -BEKA    L5 (lateral lower leg/great toe) Intact  -BEKA    S1 (bottom of foot) Intact  -BEKA       Myotomal Screen- Lower Quarter Clearing    Hip flexion (L2) Right:;4 (Good);Left:;5 (Normal)  -BEKA    Knee extension (L3) Right:;4+ (Good +);Left:;5 (Normal)   pain limited right  -BEKA    Ankle DF (L4) Bilateral:;5 (Normal)   pain increased  right  -BEKA    Great toe extension (L5) Bilateral:;WNL  -BEKA    Ankle PF (S1) Bilateral:;WNL  -BEKA    Knee flexion (S2) Bilateral:;5 (Normal)   pain right  -BEKA       Lumbar ROM Screen- Lower Quarter Clearing    Lumbar Flexion Impaired   35 pain along right lumbar  -BEKA    Lumbar Extension Impaired   10 pain mid lumbar  -BEKA    Lumbar Lateral Flexion Normal   pain to left  -BEKA       SI/Hip Screen- Lower Quarter Clearing    ASIS compression Positive  -BEKA    Sena's/Emerson's test Bilateral:;Positive  -BEKA    Posterior thigh sheer Right:;Positive  -BEKA       Special Tests/Palpation    Special Tests/Palpation Lumbar/SI;Hip  -BEKA       Lumbosacral Palpation    Lumbosacral Segment Right:;Guarded/taut;Tender;Elicits spasm  -BEKA    Thoracolumbar Segment Right:;Guarded/taut;Tender  -BEKA    Quadratus Lumborum Right:;Guarded/taut;Tender;Elicits spasm  -BEKA    Erector Spinae (Paraspinals) Right:;Guarded/taut;Tender  -BEKA    Lumbosacral Palpation? Yes  -BEKA       Lumbar/SI Special Tests    SENA (hip vs. SI Dysfunction) Bilateral:;Positive   restricted and pain  -BEKA    FAIR Test (Piriformis Syndrome) Bilateral:;Positive  -BEKA    Sacral Spring Test (SI Dysfunction) Bilateral:;Positive  -BEKA       Courtney's Signs    Superficial and non-anatomical tenderness Negative  -BEKA    Distraction straight leg raise test (sitting vs supine) Negative  -BEKA    Regional disturbances Positive  -BEKA    Overreaction to examination Positive  -BEKA       Hip/Thigh Palpation    Greater Trochanter Bilateral:;Tender  -BEKA    Piriformis Bilateral:;Guarded/taut;Tender  -BEKA    SI Bilateral:;Tender  -BEKA    Hip/Thigh Palpation? Yes  -BEKA       MMT (Manual Muscle Testing)    Additional Documentation General Assessment (Manual Muscle Testing) (Group)  -BEKA       General Assessment (Manual Muscle Testing)    General Manual Muscle Testing (MMT) Assessment lower extremity strength deficits identified  -BEKA       Lower Extremity (Manual Muscle Testing)    Comment, MMT: Lower Extremity  see quarter clearing.  Pain with testing of glute max inconclusive findings  -BEKA       Sensation    Sensation WNL? WNL  -BEKA       Flexibility    Flexibility Tested? Lower Extremity  -BEKA       Lower Extremity Flexibility    Hamstrings Bilateral:;Mildly limited  -BEKA    Quadriceps Bilateral:;Mildly limited   right pain  -BEKA    Hip External Rotators Bilateral:;Moderately limited  -BEKA    Hip Internal Rotators Bilateral:;Moderately limited  -BEKA       Gait/Stairs Assessment/Training    Comment (Gait/Stairs) exaggerated antalgia with slowed josie, flexed at hips, decreased stride length. No AD  -BEKA      User Key  (r) = Recorded By, (t) = Taken By, (c) = Cosigned By    Initials Name Provider Type    BEKA Raz Morris, PT Physical Therapist                      Therapy Education  Education Details: initiated HEP to inlcude LTR and SKTC  Given: HEP  Program: New  How Provided: Verbal, Demonstration, Written  Provided to: Patient  Level of Understanding: Verbalized, Demonstrated           PT OP Goals     Row Name 07/19/18 0900          PT Short Term Goals    STG Date to Achieve 08/02/18  -BEKA     STG 1 Patient to report pre treatment pain </= 5/10  -BEKA     STG 1 Progress New  -BEKA     STG 2 Patient to report improved sleep tolerance  -BEKA     STG 2 Progress New  -BEKA        Long Term Goals    LTG Date to Achieve 08/16/18  -BEKA     LTG 1 Patient to tolerate right sided lumbosacral palpation without elicited spasm  -BEKA     LTG 1 Progress New  -BEKA     LTG 2 Patient to demonstrate standing lumbar flexion to at least 60 degrees to improve functional task completion such as picking things off the floor  -BEKA     LTG 2 Progress New  -BEKA     LTG 3 Patient ot improve Mod Oswestry score to at least 40%  -BEKA     LTG 3 Progress New  -BEKA     LTG 4 Patient to be independent with final HEP  -BEKA     LTG 4 Progress New  -BEKA        Time Calculation    PT Goal Re-Cert Due Date 10/17/18  -BEKA       User Key  (r) = Recorded By, (t) = Taken By, (c) =  Cosigned By    Initials Name Provider Type    BEKA Morris, PT Physical Therapist                PT Assessment/Plan     Row Name 07/19/18 0900          PT Assessment    Functional Limitations Performance in work activities;Performance in self-care ADL;Performance in leisure activities;Limitations in functional capacity and performance;Limitations in community activities;Limitation in home management;Impaired gait  -BEKA     Impairments Range of motion;Posture;Poor body mechanics;Pain;Joint mobility;Impaired flexibility;Gait;Endurance  -BEKA     Assessment Comments Patient presents with lower back pain following a work injury involving being struck by a log in the chest.  He experienced a twisting-type of injury and has right sided muscle spasms with radiation into the right posterior hip.  Previously was experiencing referral into the RLE that has resolved. EMG/NCV was negative, as were neural tension tests.  Pain is generally constant and interferes with his ability to work, complete ADL's, and leisure activities  -BEKA     Please refer to paper survey for additional self-reported information Yes  -BEKA     Rehab Potential Good  -BEKA     Patient/caregiver participated in establishment of treatment plan and goals Yes  -BEKA     Patient would benefit from skilled therapy intervention Yes  -BEKA        PT Plan    PT Frequency 2x/week;1x/week  -BKEA     Predicted Duration of Therapy Intervention (Therapy Eval) 8 visits  -BEKA     Planned CPT's? PT EVAL LOW COMPLEXITY: 11051;PT THER PROC EA 15 MIN: 26362;PT MANUAL THERAPY EA 15 MIN: 70591;PT ELECTRICAL STIM UNATTEND: ;PT HOT/COLD PACK WC NONMCARE: 92416;PT TRACTION LUMBAR: 83106  -BEKA     PT Plan Comments begin with trunk mobility and hip stretching with progression to core stabilization and hip strength as tolerated.  Modalities and manual techniques as indicated.  -BEKA       User Key  (r) = Recorded By, (t) = Taken By, (c) = Cosigned By    Initials Name Provider Type    BEKA  Raz Morris, PT Physical Therapist                       Time Calculation:     Therapy Suggested Charges     Code   Minutes Charges    None             Start Time: 0848     Therapy Charges for Today     Code Description Service Date Service Provider Modifiers Qty    [id number]  PT EVAL LOW COMPLEXITY 4 7/19/2018 Raz Morris, PT GP 1                    aRz Morris, PT  7/19/2018

## 2018-07-27 ENCOUNTER — APPOINTMENT (OUTPATIENT)
Dept: PHYSICAL THERAPY | Facility: HOSPITAL | Age: 53
End: 2018-07-27

## 2018-08-31 ENCOUNTER — DOCUMENTATION (OUTPATIENT)
Dept: PHYSICAL THERAPY | Facility: HOSPITAL | Age: 53
End: 2018-08-31

## 2018-08-31 DIAGNOSIS — M51.36 BULGING LUMBAR DISC: ICD-10-CM

## 2018-08-31 DIAGNOSIS — M54.31 SCIATICA OF RIGHT SIDE: ICD-10-CM

## 2018-08-31 DIAGNOSIS — M51.36 DEGENERATIVE DISC DISEASE, LUMBAR: Primary | ICD-10-CM

## 2019-04-12 ENCOUNTER — HOSPITAL ENCOUNTER (EMERGENCY)
Facility: HOSPITAL | Age: 54
Discharge: HOME | End: 2019-04-13
Attending: EMERGENCY MEDICINE | Admitting: EMERGENCY MEDICINE

## 2019-04-12 ENCOUNTER — APPOINTMENT (OUTPATIENT)
Dept: GENERAL RADIOLOGY | Facility: HOSPITAL | Age: 54
End: 2019-04-12

## 2019-04-12 VITALS
HEART RATE: 77 BPM | SYSTOLIC BLOOD PRESSURE: 107 MMHG | TEMPERATURE: 98.6 F | OXYGEN SATURATION: 99 % | WEIGHT: 190 LBS | RESPIRATION RATE: 16 BRPM | BODY MASS INDEX: 25.18 KG/M2 | HEIGHT: 73 IN | DIASTOLIC BLOOD PRESSURE: 82 MMHG

## 2019-04-12 DIAGNOSIS — G89.29 CHRONIC PAIN OF BOTH HIPS: Primary | ICD-10-CM

## 2019-04-12 DIAGNOSIS — F14.10 COCAINE ABUSE (HCC): ICD-10-CM

## 2019-04-12 DIAGNOSIS — M25.551 CHRONIC PAIN OF BOTH HIPS: Primary | ICD-10-CM

## 2019-04-12 DIAGNOSIS — M25.552 CHRONIC PAIN OF BOTH HIPS: Primary | ICD-10-CM

## 2019-04-12 LAB
ALBUMIN SERPL-MCNC: 4.9 G/DL (ref 3.5–5.2)
ALBUMIN/GLOB SERPL: 1.6 G/DL
ALP SERPL-CCNC: 59 U/L (ref 39–117)
ALT SERPL W P-5'-P-CCNC: 49 U/L (ref 1–41)
AMPHET+METHAMPHET UR QL: NEGATIVE
AMPHETAMINES UR QL: NEGATIVE
ANION GAP SERPL CALCULATED.3IONS-SCNC: 16 MMOL/L
AST SERPL-CCNC: 42 U/L (ref 1–40)
BARBITURATES UR QL SCN: NEGATIVE
BASOPHILS # BLD AUTO: 0.05 10*3/MM3 (ref 0–0.2)
BASOPHILS NFR BLD AUTO: 0.5 % (ref 0–1.5)
BENZODIAZ UR QL SCN: NEGATIVE
BILIRUB SERPL-MCNC: 0.3 MG/DL (ref 0.2–1.2)
BILIRUB UR QL STRIP: NEGATIVE
BUN BLD-MCNC: 7 MG/DL (ref 6–20)
BUN/CREAT SERPL: 6.4 (ref 7–25)
BUPRENORPHINE SERPL-MCNC: NEGATIVE NG/ML
CALCIUM SPEC-SCNC: 9.7 MG/DL (ref 8.6–10.5)
CANNABINOIDS SERPL QL: NEGATIVE
CHLORIDE SERPL-SCNC: 98 MMOL/L (ref 98–107)
CLARITY UR: CLEAR
CO2 SERPL-SCNC: 24 MMOL/L (ref 22–29)
COCAINE UR QL: POSITIVE
COLOR UR: YELLOW
CREAT BLD-MCNC: 1.09 MG/DL (ref 0.76–1.27)
DEPRECATED RDW RBC AUTO: 45.8 FL (ref 37–54)
EOSINOPHIL # BLD AUTO: 0.07 10*3/MM3 (ref 0–0.4)
EOSINOPHIL NFR BLD AUTO: 0.7 % (ref 0.3–6.2)
ERYTHROCYTE [DISTWIDTH] IN BLOOD BY AUTOMATED COUNT: 13.4 % (ref 12.3–15.4)
GFR SERPL CREATININE-BSD FRML MDRD: 86 ML/MIN/1.73
GLOBULIN UR ELPH-MCNC: 3 GM/DL
GLUCOSE BLD-MCNC: 128 MG/DL (ref 65–99)
GLUCOSE UR STRIP-MCNC: NEGATIVE MG/DL
HCT VFR BLD AUTO: 44.8 % (ref 37.5–51)
HGB BLD-MCNC: 15.3 G/DL (ref 13–17.7)
HGB UR QL STRIP.AUTO: NEGATIVE
HOLD SPECIMEN: NORMAL
HOLD SPECIMEN: NORMAL
IMM GRANULOCYTES # BLD AUTO: 0.02 10*3/MM3 (ref 0–0.05)
IMM GRANULOCYTES NFR BLD AUTO: 0.2 % (ref 0–0.5)
KETONES UR QL STRIP: NEGATIVE
LEUKOCYTE ESTERASE UR QL STRIP.AUTO: NEGATIVE
LYMPHOCYTES # BLD AUTO: 3.33 10*3/MM3 (ref 0.7–3.1)
LYMPHOCYTES NFR BLD AUTO: 33.1 % (ref 19.6–45.3)
MAGNESIUM SERPL-MCNC: 2.2 MG/DL (ref 1.6–2.6)
MCH RBC QN AUTO: 31.8 PG (ref 26.6–33)
MCHC RBC AUTO-ENTMCNC: 34.2 G/DL (ref 31.5–35.7)
MCV RBC AUTO: 93.1 FL (ref 79–97)
METHADONE UR QL SCN: NEGATIVE
MONOCYTES # BLD AUTO: 0.34 10*3/MM3 (ref 0.1–0.9)
MONOCYTES NFR BLD AUTO: 3.4 % (ref 5–12)
NEUTROPHILS # BLD AUTO: 6.26 10*3/MM3 (ref 1.4–7)
NEUTROPHILS NFR BLD AUTO: 62.1 % (ref 42.7–76)
NITRITE UR QL STRIP: NEGATIVE
OPIATES UR QL: NEGATIVE
OXYCODONE UR QL SCN: NEGATIVE
PCP UR QL SCN: NEGATIVE
PH UR STRIP.AUTO: 5.5 [PH] (ref 5–8)
PLATELET # BLD AUTO: 299 10*3/MM3 (ref 140–450)
PMV BLD AUTO: 10.2 FL (ref 6–12)
POTASSIUM BLD-SCNC: 3.5 MMOL/L (ref 3.5–5.2)
PROPOXYPH UR QL: NEGATIVE
PROT SERPL-MCNC: 7.9 G/DL (ref 6–8.5)
PROT UR QL STRIP: NEGATIVE
RBC # BLD AUTO: 4.81 10*6/MM3 (ref 4.14–5.8)
SODIUM BLD-SCNC: 138 MMOL/L (ref 136–145)
SP GR UR STRIP: 1.01 (ref 1–1.03)
TRICYCLICS UR QL SCN: NEGATIVE
TROPONIN I SERPL-MCNC: 0 NG/ML (ref 0–0.07)
UROBILINOGEN UR QL STRIP: NORMAL
WBC NRBC COR # BLD: 10.07 10*3/MM3 (ref 3.4–10.8)
WHOLE BLOOD HOLD SPECIMEN: NORMAL

## 2019-04-12 PROCEDURE — 84484 ASSAY OF TROPONIN QUANT: CPT

## 2019-04-12 PROCEDURE — 93005 ELECTROCARDIOGRAM TRACING: CPT | Performed by: EMERGENCY MEDICINE

## 2019-04-12 PROCEDURE — 85025 COMPLETE CBC W/AUTO DIFF WBC: CPT | Performed by: EMERGENCY MEDICINE

## 2019-04-12 PROCEDURE — 80306 DRUG TEST PRSMV INSTRMNT: CPT | Performed by: EMERGENCY MEDICINE

## 2019-04-12 PROCEDURE — 83735 ASSAY OF MAGNESIUM: CPT | Performed by: EMERGENCY MEDICINE

## 2019-04-12 PROCEDURE — 71045 X-RAY EXAM CHEST 1 VIEW: CPT

## 2019-04-12 PROCEDURE — 81003 URINALYSIS AUTO W/O SCOPE: CPT | Performed by: EMERGENCY MEDICINE

## 2019-04-12 PROCEDURE — 80053 COMPREHEN METABOLIC PANEL: CPT | Performed by: EMERGENCY MEDICINE

## 2019-04-12 PROCEDURE — 99284 EMERGENCY DEPT VISIT MOD MDM: CPT

## 2019-04-12 RX ORDER — SODIUM CHLORIDE 0.9 % (FLUSH) 0.9 %
10 SYRINGE (ML) INJECTION AS NEEDED
Status: DISCONTINUED | OUTPATIENT
Start: 2019-04-12 | End: 2019-04-13 | Stop reason: HOSPADM

## 2019-04-13 LAB — WHOLE BLOOD HOLD SPECIMEN: NORMAL

## 2019-04-13 RX ORDER — IBUPROFEN 600 MG/1
600 TABLET ORAL ONCE
Status: COMPLETED | OUTPATIENT
Start: 2019-04-13 | End: 2019-04-13

## 2019-04-13 RX ADMIN — IBUPROFEN 600 MG: 600 TABLET ORAL at 01:12

## 2021-07-22 ENCOUNTER — TELEPHONE (OUTPATIENT)
Dept: ORTHOPEDIC SURGERY | Facility: CLINIC | Age: 56
End: 2021-07-22

## 2022-05-03 ENCOUNTER — TELEPHONE (OUTPATIENT)
Dept: NEUROSURGERY | Facility: CLINIC | Age: 57
End: 2022-05-03

## 2022-05-03 ENCOUNTER — OFFICE VISIT (OUTPATIENT)
Dept: NEUROSURGERY | Facility: CLINIC | Age: 57
End: 2022-05-03

## 2022-05-03 VITALS
DIASTOLIC BLOOD PRESSURE: 70 MMHG | HEIGHT: 73 IN | SYSTOLIC BLOOD PRESSURE: 120 MMHG | TEMPERATURE: 96.8 F | BODY MASS INDEX: 31.04 KG/M2 | WEIGHT: 234.2 LBS

## 2022-05-03 DIAGNOSIS — M51.36 DEGENERATIVE DISC DISEASE, LUMBAR: Primary | ICD-10-CM

## 2022-05-03 DIAGNOSIS — G89.29 CHRONIC BILATERAL LOW BACK PAIN WITHOUT SCIATICA: ICD-10-CM

## 2022-05-03 DIAGNOSIS — M51.36 BULGING LUMBAR DISC: ICD-10-CM

## 2022-05-03 DIAGNOSIS — M54.50 CHRONIC BILATERAL LOW BACK PAIN WITHOUT SCIATICA: ICD-10-CM

## 2022-05-03 PROCEDURE — 99204 OFFICE O/P NEW MOD 45 MIN: CPT | Performed by: PHYSICIAN ASSISTANT

## 2022-05-03 RX ORDER — DICLOFENAC SODIUM 75 MG/1
TABLET, DELAYED RELEASE ORAL
COMMUNITY

## 2022-05-03 RX ORDER — TRAZODONE HYDROCHLORIDE 100 MG/1
TABLET ORAL
COMMUNITY

## 2022-05-03 RX ORDER — OMEPRAZOLE 40 MG/1
CAPSULE, DELAYED RELEASE ORAL
COMMUNITY
End: 2022-05-03 | Stop reason: DRUGHIGH

## 2022-05-03 RX ORDER — OLANZAPINE 5 MG/1
TABLET ORAL
COMMUNITY

## 2022-05-03 RX ORDER — DULOXETIN HYDROCHLORIDE 60 MG/1
60 CAPSULE, DELAYED RELEASE ORAL DAILY
COMMUNITY
Start: 2022-04-28

## 2022-05-03 RX ORDER — OMEPRAZOLE 20 MG/1
CAPSULE, DELAYED RELEASE ORAL
COMMUNITY
Start: 2022-01-25

## 2022-05-03 RX ORDER — DULOXETIN HYDROCHLORIDE 30 MG/1
CAPSULE, DELAYED RELEASE ORAL
COMMUNITY
Start: 2022-04-28

## 2022-05-03 NOTE — PROGRESS NOTES
"  Patient: Eduin Gaxiola  : 1965  Chart #: [id number]    Date of Service: 2022    Chief Complaint   Patient presents with   • Back Pain     New patient       HPI  This is a 56-year-old gentleman with chronic low back pain it is gotten worse the last 3 years.  He reports that his back pain is worse with standing and walking.  He recently had injections in his lower back done  at [facility] and he reports that these did not help at all.  Recently last year he had both hips replaced he recently had knee surgery, he has been through multiple rounds of physical therapy due to his hips and knee but no therapy for his lower back.  He does have some right buttocks pain but this seems to come and go, he denies any pain radiating into the legs.    Chronic Illnesses:  Chronic low back pain  Past Medical History:   Diagnosis Date   • Arthritis    • GERD (gastroesophageal reflux disease)    • Headache     \"KEEPS A HEADACHE   • Hernia, inguinal, right    • Low back pain    • Sciatica          Past Surgical History:   Procedure Laterality Date   • ARM REPLANTATION Right    • HERNIA REPAIR     • HIP SURGERY         • KNEE SURGERY             Allergies   Allergen Reactions   • Penicillins Anaphylaxis         Current Outpatient Medications:   •  diclofenac (VOLTAREN) 75 MG EC tablet, diclofenac sodium 75 mg tablet,delayed release, Disp: , Rfl:   •  diphenhydrAMINE (BENADRYL) 25 mg capsule, Take 2 capsules by mouth Take As Directed. Take both capsules by mouth 2 hours before myelogram., Disp: 2 capsule, Rfl: 0  •  DULoxetine (CYMBALTA) 30 MG capsule, , Disp: , Rfl:   •  DULoxetine (CYMBALTA) 60 MG capsule, Take 60 mg by mouth Daily., Disp: , Rfl:   •  gabapentin (NEURONTIN) 300 MG capsule, Take 1 capsule by mouth 4 (Four) Times a Day., Disp: 120 capsule, Rfl: 0  •  OLANZapine (zyPREXA) 5 MG tablet, olanzapine 5 mg tablet  TAKE 1 TABLET BY MOUTH DAILY, Disp: , Rfl:   •  omeprazole (priLOSEC) 20 MG " "capsule, , Disp: , Rfl:   •  predniSONE (DELTASONE) 50 MG tablet, Take 1 tablet by mouth Daily., Disp: 5 tablet, Rfl: 0  •  promethazine (PHENERGAN) 25 MG tablet, Take 1 tablet by mouth Every 6 (Six) Hours As Needed for Nausea or Vomiting., Disp: 12 tablet, Rfl: 0  •  traZODone (DESYREL) 100 MG tablet, trazodone 100 mg tablet  TAKE 1 TABLET BY MOUTH EVERY NIGHT AT BEDTIME, Disp: , Rfl:     Social History     Socioeconomic History   • Marital status: Single   Tobacco Use   • Smoking status: Current Every Day Smoker     Packs/day: 0.50     Years: 40.00     Pack years: 20.00     Types: Cigarettes   • Smokeless tobacco: Never Used   Substance and Sexual Activity   • Alcohol use: Yes     Comment: STOPPED LAST WEEK   • Drug use: No   • Sexual activity: Defer       Family History   Problem Relation Age of Onset   • Heart disease Mother    • Cancer Father        BMI is >= 30 and <= 34.9 (Class 1 obesity). The following options were offered after discussion: exercise counseling/recommendations, I have given him a referral to physical therapy.       Social History    Tobacco Use      Smoking status: Current Every Day Smoker        Packs/day: 0.50        Years: 40.00        Pack years: 20        Types: Cigarettes      Smokeless tobacco: Never Used       Physical examination:  Blood pressure 120/70, temperature 96.8 °F (36 °C), height 185.4 cm (73\"), weight 106 kg (234 lb 3.2 oz).  HEENT- normocephalic, atraumatic, sclera clear  Lungs-normal expansion, no wheezing  Heart-regular rate and rhythm  Extremities-positive pulses, no edema    Neurologic Exam  WDWN -American male  A/A/C, speech clear, attention normal, conversant, answers questions appropriately, good historian.  Cranial nerves II through XII are intact.  Motor examination does not reveal weakness in the , upper or lower extremities.   Sensation is intact.  Gait is normal, balance is normal.   No tremors are noted.  Reflexes are intact.   Palpation of the " paraspinal musculature is unrevealing, no scoliosis noted, pain noted in the mid lower back.    Radiographic Imaging:  For my review is an MRI of the lumbar spine which shows degenerative disc disease at L3-4 and mildly at L5-S1.  He has normal alignment.  Axial views reveal mild facet arthropathy with out nerve root compression.    Medical Decision Making  Assessment and Plan:  Assessment/Plan    Diagnoses and all orders for this visit:    1. Degenerative disc disease, lumbar (Primary)  -     Ambulatory Referral to Physical Therapy Evaluate and treat    2. Chronic bilateral low back pain without sciatica    I am making referral to physical therapy for lumbar Rah exercise program.  No findings of nerve root compression or abnormalities on MRI scan that would require surgical intervention.  It was a pleasure providing neurosurgical evaluation.  No formal follow-up from a neurosurgical perspective is required.       SILVANO Velazquez    Patient Care Team:  Anna Robledo APRN as PCP - General (Nurse Practitioner)

## 2024-01-06 ENCOUNTER — HOSPITAL ENCOUNTER (EMERGENCY)
Facility: HOSPITAL | Age: 59
Discharge: HOME | End: 2024-01-06
Attending: EMERGENCY MEDICINE
Payer: COMMERCIAL

## 2024-01-06 ENCOUNTER — APPOINTMENT (OUTPATIENT)
Dept: GENERAL RADIOLOGY | Facility: HOSPITAL | Age: 59
End: 2024-01-06
Payer: COMMERCIAL

## 2024-01-06 VITALS
RESPIRATION RATE: 20 BRPM | HEIGHT: 73 IN | DIASTOLIC BLOOD PRESSURE: 74 MMHG | HEART RATE: 71 BPM | OXYGEN SATURATION: 100 % | TEMPERATURE: 97.8 F | WEIGHT: 175 LBS | SYSTOLIC BLOOD PRESSURE: 105 MMHG | BODY MASS INDEX: 23.19 KG/M2

## 2024-01-06 DIAGNOSIS — M54.50 ACUTE EXACERBATION OF CHRONIC LOW BACK PAIN: Primary | ICD-10-CM

## 2024-01-06 DIAGNOSIS — G89.29 ACUTE EXACERBATION OF CHRONIC LOW BACK PAIN: Primary | ICD-10-CM

## 2024-01-06 DIAGNOSIS — M25.552 CHRONIC LEFT HIP PAIN: ICD-10-CM

## 2024-01-06 DIAGNOSIS — G89.29 CHRONIC LEFT HIP PAIN: ICD-10-CM

## 2024-01-06 DIAGNOSIS — F19.10 POLYSUBSTANCE ABUSE: ICD-10-CM

## 2024-01-06 LAB
AMPHET+METHAMPHET UR QL: POSITIVE
AMPHETAMINES UR QL: POSITIVE
ANION GAP SERPL CALCULATED.3IONS-SCNC: 14 MMOL/L (ref 5–15)
BARBITURATES UR QL SCN: NEGATIVE
BASOPHILS # BLD AUTO: 0.11 10*3/MM3 (ref 0–0.2)
BASOPHILS NFR BLD AUTO: 1.2 % (ref 0–1.5)
BENZODIAZ UR QL SCN: NEGATIVE
BILIRUB UR QL STRIP: NEGATIVE
BUN SERPL-MCNC: 8 MG/DL (ref 6–20)
BUN/CREAT SERPL: 8.2 (ref 7–25)
BUPRENORPHINE SERPL-MCNC: NEGATIVE NG/ML
CALCIUM SPEC-SCNC: 10 MG/DL (ref 8.6–10.5)
CANNABINOIDS SERPL QL: POSITIVE
CHLORIDE SERPL-SCNC: 102 MMOL/L (ref 98–107)
CLARITY UR: CLEAR
CO2 SERPL-SCNC: 21 MMOL/L (ref 22–29)
COCAINE UR QL: POSITIVE
COLOR UR: ABNORMAL
CREAT SERPL-MCNC: 0.97 MG/DL (ref 0.76–1.27)
DEPRECATED RDW RBC AUTO: 44.6 FL (ref 37–54)
EGFRCR SERPLBLD CKD-EPI 2021: 91.6 ML/MIN/1.73
EOSINOPHIL # BLD AUTO: 0.34 10*3/MM3 (ref 0–0.4)
EOSINOPHIL NFR BLD AUTO: 3.8 % (ref 0.3–6.2)
ERYTHROCYTE [DISTWIDTH] IN BLOOD BY AUTOMATED COUNT: 13.2 % (ref 12.3–15.4)
ERYTHROCYTE [SEDIMENTATION RATE] IN BLOOD: 19 MM/HR (ref 0–20)
FENTANYL UR-MCNC: NEGATIVE NG/ML
GLUCOSE SERPL-MCNC: 69 MG/DL (ref 65–99)
GLUCOSE UR STRIP-MCNC: NEGATIVE MG/DL
HCT VFR BLD AUTO: 46.8 % (ref 37.5–51)
HGB BLD-MCNC: 15.5 G/DL (ref 13–17.7)
HGB UR QL STRIP.AUTO: NEGATIVE
IMM GRANULOCYTES # BLD AUTO: 0.01 10*3/MM3 (ref 0–0.05)
IMM GRANULOCYTES NFR BLD AUTO: 0.1 % (ref 0–0.5)
KETONES UR QL STRIP: ABNORMAL
LEUKOCYTE ESTERASE UR QL STRIP.AUTO: NEGATIVE
LYMPHOCYTES # BLD AUTO: 3.81 10*3/MM3 (ref 0.7–3.1)
LYMPHOCYTES NFR BLD AUTO: 43.1 % (ref 19.6–45.3)
MCH RBC QN AUTO: 30.5 PG (ref 26.6–33)
MCHC RBC AUTO-ENTMCNC: 33.1 G/DL (ref 31.5–35.7)
MCV RBC AUTO: 91.9 FL (ref 79–97)
METHADONE UR QL SCN: NEGATIVE
MONOCYTES # BLD AUTO: 0.62 10*3/MM3 (ref 0.1–0.9)
MONOCYTES NFR BLD AUTO: 7 % (ref 5–12)
NEUTROPHILS NFR BLD AUTO: 3.95 10*3/MM3 (ref 1.7–7)
NEUTROPHILS NFR BLD AUTO: 44.8 % (ref 42.7–76)
NITRITE UR QL STRIP: NEGATIVE
NRBC BLD AUTO-RTO: 0 /100 WBC (ref 0–0.2)
OPIATES UR QL: NEGATIVE
OXYCODONE UR QL SCN: NEGATIVE
PCP UR QL SCN: NEGATIVE
PH UR STRIP.AUTO: 6 [PH] (ref 5–8)
PLATELET # BLD AUTO: 360 10*3/MM3 (ref 140–450)
PMV BLD AUTO: 9.9 FL (ref 6–12)
POTASSIUM SERPL-SCNC: 4.8 MMOL/L (ref 3.5–5.2)
PROCALCITONIN SERPL-MCNC: 0.03 NG/ML (ref 0–0.25)
PROT UR QL STRIP: NEGATIVE
RBC # BLD AUTO: 5.09 10*6/MM3 (ref 4.14–5.8)
SODIUM SERPL-SCNC: 137 MMOL/L (ref 136–145)
SP GR UR STRIP: 1.02 (ref 1–1.03)
TRICYCLICS UR QL SCN: NEGATIVE
UROBILINOGEN UR QL STRIP: ABNORMAL
WBC NRBC COR # BLD AUTO: 8.84 10*3/MM3 (ref 3.4–10.8)

## 2024-01-06 PROCEDURE — 25010000002 KETOROLAC TROMETHAMINE PER 15 MG: Performed by: EMERGENCY MEDICINE

## 2024-01-06 PROCEDURE — 36415 COLL VENOUS BLD VENIPUNCTURE: CPT

## 2024-01-06 PROCEDURE — 85652 RBC SED RATE AUTOMATED: CPT | Performed by: EMERGENCY MEDICINE

## 2024-01-06 PROCEDURE — 99283 EMERGENCY DEPT VISIT LOW MDM: CPT

## 2024-01-06 PROCEDURE — 80307 DRUG TEST PRSMV CHEM ANLYZR: CPT | Performed by: EMERGENCY MEDICINE

## 2024-01-06 PROCEDURE — 25010000002 MORPHINE PER 10 MG: Performed by: EMERGENCY MEDICINE

## 2024-01-06 PROCEDURE — 96376 TX/PRO/DX INJ SAME DRUG ADON: CPT

## 2024-01-06 PROCEDURE — 80048 BASIC METABOLIC PNL TOTAL CA: CPT | Performed by: EMERGENCY MEDICINE

## 2024-01-06 PROCEDURE — 96375 TX/PRO/DX INJ NEW DRUG ADDON: CPT

## 2024-01-06 PROCEDURE — 85025 COMPLETE CBC W/AUTO DIFF WBC: CPT | Performed by: EMERGENCY MEDICINE

## 2024-01-06 PROCEDURE — 96374 THER/PROPH/DIAG INJ IV PUSH: CPT

## 2024-01-06 PROCEDURE — 73502 X-RAY EXAM HIP UNI 2-3 VIEWS: CPT

## 2024-01-06 PROCEDURE — 84145 PROCALCITONIN (PCT): CPT | Performed by: EMERGENCY MEDICINE

## 2024-01-06 PROCEDURE — 25010000002 ONDANSETRON PER 1 MG: Performed by: EMERGENCY MEDICINE

## 2024-01-06 PROCEDURE — 72100 X-RAY EXAM L-S SPINE 2/3 VWS: CPT

## 2024-01-06 PROCEDURE — 81003 URINALYSIS AUTO W/O SCOPE: CPT | Performed by: EMERGENCY MEDICINE

## 2024-01-06 RX ORDER — SODIUM CHLORIDE 0.9 % (FLUSH) 0.9 %
10 SYRINGE (ML) INJECTION AS NEEDED
Status: DISCONTINUED | OUTPATIENT
Start: 2024-01-06 | End: 2024-01-06 | Stop reason: HOSPADM

## 2024-01-06 RX ORDER — ONDANSETRON 2 MG/ML
4 INJECTION INTRAMUSCULAR; INTRAVENOUS ONCE
Status: COMPLETED | OUTPATIENT
Start: 2024-01-06 | End: 2024-01-06

## 2024-01-06 RX ORDER — MORPHINE SULFATE 4 MG/ML
4 INJECTION, SOLUTION INTRAMUSCULAR; INTRAVENOUS
Status: DISCONTINUED | OUTPATIENT
Start: 2024-01-06 | End: 2024-01-06 | Stop reason: HOSPADM

## 2024-01-06 RX ORDER — MORPHINE SULFATE 4 MG/ML
4 INJECTION, SOLUTION INTRAMUSCULAR; INTRAVENOUS ONCE
Status: COMPLETED | OUTPATIENT
Start: 2024-01-06 | End: 2024-01-06

## 2024-01-06 RX ORDER — KETOROLAC TROMETHAMINE 15 MG/ML
15 INJECTION, SOLUTION INTRAMUSCULAR; INTRAVENOUS ONCE
Status: COMPLETED | OUTPATIENT
Start: 2024-01-06 | End: 2024-01-06

## 2024-01-06 RX ORDER — DULOXETIN HYDROCHLORIDE 30 MG/1
30 CAPSULE, DELAYED RELEASE ORAL 2 TIMES DAILY
Qty: 60 CAPSULE | Refills: 0 | Status: SHIPPED | OUTPATIENT
Start: 2024-01-06 | End: 2024-02-05

## 2024-01-06 RX ORDER — DICLOFENAC SODIUM 75 MG/1
75 TABLET, DELAYED RELEASE ORAL 2 TIMES DAILY
Qty: 60 TABLET | Refills: 0 | Status: SHIPPED | OUTPATIENT
Start: 2024-01-06 | End: 2024-02-05

## 2024-01-06 RX ADMIN — KETOROLAC TROMETHAMINE 15 MG: 15 INJECTION, SOLUTION INTRAMUSCULAR; INTRAVENOUS at 15:10

## 2024-01-06 RX ADMIN — MORPHINE SULFATE 4 MG: 4 INJECTION, SOLUTION INTRAMUSCULAR; INTRAVENOUS at 15:10

## 2024-01-06 RX ADMIN — MORPHINE SULFATE 4 MG: 4 INJECTION, SOLUTION INTRAMUSCULAR; INTRAVENOUS at 12:05

## 2024-01-06 RX ADMIN — ONDANSETRON 4 MG: 2 INJECTION INTRAMUSCULAR; INTRAVENOUS at 12:05

## 2024-01-06 NOTE — CASE MANAGEMENT/SOCIAL WORK
Continued Stay Note  [facility]     Patient Name: Eduin Gaxiola  MRN: [id number]  Today's Date: 1/6/2024    Admit Date: 1/6/2024    Plan: Lyft back to [facility] and addiction resources.   Discharge Plan       Row Name 01/06/24 1530       Plan    Plan Lyft back to [facility] and addiction resources.    Plan Comments RN came to [facility] needing assistance with transportation and addiction resources. [facility] provided RN with addiction resources. [facility] arranged a Lyft for pt. back to [facility]. [facility] updated RN of make, model and ETA of vehicle. [facility] is available.    Final Discharge Disposition Code 01 - home or self-care                   Discharge Codes    No documentation.                       RICH Almonte
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
## 2024-11-27 NOTE — TELEPHONE ENCOUNTER
Provider:  Josesito  Caller: self  Time of call: [phone]    Phone #:     Last visit: 2/12/18    Next visit: darinduran CLARK:         12/27/2017 Hydrocodone/Acetaminophen  325MG/5MG  1965 20 5 Donato Tsang [place] [id number] 20 1  01/22/2018 Tramadol Hcl 50MG 1965 60 10 Lucio Bellamy [place] [id number] 30 3  02/05/2018 Hydrocodone/Acetaminophen  325MG/5MG  1965 30 7 Lucio Bellamy [facility] 21 1  02/12/2018 Carisoprodol 350MG 1967 90 30 Lucio Bellamy [place] [facility] 1  02/12/2018 Gabapentin 300MG 1967 120 30 Lucio Bellamy [place] [facility] 1  02/12/2018 Hydrocodone/Acetaminophen  325MG/7.5MG  1965 30 15 Lucio Bellamy [place] [facility] 15 1    Reason for call:         Pt called to request refills on his previous medications as well as to reschedule his diagnostic studies. He was unable to perform the desired studied due to being incarcerated. All controlled substances set to print.    Retail Pharmacy Prior Authorization Team  Phone: [phone]    PRIOR AUTHORIZATION DENIED    Medication: OZEMPIC (0.25 OR 0.5 MG/DOSE) 2 MG/3ML SC SOPN  Insurance Company: [employer] - Phone [phone] Fax [phone]  Denial Date: 11/27/2024  Denial Reason(s):         Appeal Information:         Patient Notified: NO- Unfortunately, we cannot call the patient with denials because we do not know what next steps the MD will take nor can we give medical advice, please notify the patient of what they are to expect for the continuation of their therapy from the provider.